# Patient Record
Sex: FEMALE | Race: OTHER | HISPANIC OR LATINO | URBAN - METROPOLITAN AREA
[De-identification: names, ages, dates, MRNs, and addresses within clinical notes are randomized per-mention and may not be internally consistent; named-entity substitution may affect disease eponyms.]

---

## 2020-01-15 ENCOUNTER — EMERGENCY (EMERGENCY)
Facility: HOSPITAL | Age: 35
LOS: 1 days | Discharge: HOME | End: 2020-01-15
Attending: EMERGENCY MEDICINE
Payer: COMMERCIAL

## 2020-01-15 VITALS
RESPIRATION RATE: 20 BRPM | HEART RATE: 113 BPM | WEIGHT: 179.9 LBS | OXYGEN SATURATION: 99 % | SYSTOLIC BLOOD PRESSURE: 113 MMHG | TEMPERATURE: 98 F | DIASTOLIC BLOOD PRESSURE: 78 MMHG | HEIGHT: 62 IN

## 2020-01-15 LAB
ALBUMIN SERPL ELPH-MCNC: 4.2 G/DL — SIGNIFICANT CHANGE UP (ref 3.3–5)
ALP SERPL-CCNC: 107 U/L — SIGNIFICANT CHANGE UP (ref 40–120)
ALT FLD-CCNC: 20 U/L — SIGNIFICANT CHANGE UP (ref 10–45)
ANION GAP SERPL CALC-SCNC: 14 MMOL/L — SIGNIFICANT CHANGE UP (ref 5–17)
APPEARANCE UR: CLEAR — SIGNIFICANT CHANGE UP
AST SERPL-CCNC: 15 U/L — SIGNIFICANT CHANGE UP (ref 10–40)
BACTERIA # UR AUTO: NEGATIVE — SIGNIFICANT CHANGE UP
BASE EXCESS BLDV CALC-SCNC: 0.4 MMOL/L — SIGNIFICANT CHANGE UP (ref -2–2)
BASOPHILS # BLD AUTO: 0.06 K/UL — SIGNIFICANT CHANGE UP (ref 0–0.2)
BASOPHILS NFR BLD AUTO: 0.4 % — SIGNIFICANT CHANGE UP (ref 0–2)
BILIRUB SERPL-MCNC: 0.5 MG/DL — SIGNIFICANT CHANGE UP (ref 0.2–1.2)
BILIRUB UR-MCNC: NEGATIVE — SIGNIFICANT CHANGE UP
BUN SERPL-MCNC: 8 MG/DL — SIGNIFICANT CHANGE UP (ref 7–23)
CA-I SERPL-SCNC: 1.16 MMOL/L — SIGNIFICANT CHANGE UP (ref 1.12–1.3)
CALCIUM SERPL-MCNC: 9.3 MG/DL — SIGNIFICANT CHANGE UP (ref 8.4–10.5)
CHLORIDE BLDV-SCNC: 107 MMOL/L — SIGNIFICANT CHANGE UP (ref 96–108)
CHLORIDE SERPL-SCNC: 102 MMOL/L — SIGNIFICANT CHANGE UP (ref 96–108)
CO2 BLDV-SCNC: 26 MMOL/L — SIGNIFICANT CHANGE UP (ref 22–30)
CO2 SERPL-SCNC: 20 MMOL/L — LOW (ref 22–31)
COLOR SPEC: YELLOW — SIGNIFICANT CHANGE UP
CREAT SERPL-MCNC: 0.6 MG/DL — SIGNIFICANT CHANGE UP (ref 0.5–1.3)
DIFF PNL FLD: NEGATIVE — SIGNIFICANT CHANGE UP
EOSINOPHIL # BLD AUTO: 0.1 K/UL — SIGNIFICANT CHANGE UP (ref 0–0.5)
EOSINOPHIL NFR BLD AUTO: 0.7 % — SIGNIFICANT CHANGE UP (ref 0–6)
EPI CELLS # UR: 8 /HPF — HIGH
GAS PNL BLDV: 135 MMOL/L — SIGNIFICANT CHANGE UP (ref 135–145)
GAS PNL BLDV: SIGNIFICANT CHANGE UP
GAS PNL BLDV: SIGNIFICANT CHANGE UP
GLUCOSE BLDV-MCNC: 102 MG/DL — HIGH (ref 70–99)
GLUCOSE SERPL-MCNC: 105 MG/DL — HIGH (ref 70–99)
GLUCOSE UR QL: NEGATIVE — SIGNIFICANT CHANGE UP
HCG SERPL-ACNC: <2 MIU/ML — SIGNIFICANT CHANGE UP
HCO3 BLDV-SCNC: 25 MMOL/L — SIGNIFICANT CHANGE UP (ref 21–29)
HCT VFR BLD CALC: 39 % — SIGNIFICANT CHANGE UP (ref 34.5–45)
HCT VFR BLDA CALC: 40 % — SIGNIFICANT CHANGE UP (ref 39–50)
HGB BLD CALC-MCNC: 13 G/DL — SIGNIFICANT CHANGE UP (ref 11.5–15.5)
HGB BLD-MCNC: 12.8 G/DL — SIGNIFICANT CHANGE UP (ref 11.5–15.5)
HYALINE CASTS # UR AUTO: 4 /LPF — HIGH (ref 0–2)
IMM GRANULOCYTES NFR BLD AUTO: 0.3 % — SIGNIFICANT CHANGE UP (ref 0–1.5)
KETONES UR-MCNC: NEGATIVE — SIGNIFICANT CHANGE UP
LACTATE BLDV-MCNC: 1.3 MMOL/L — SIGNIFICANT CHANGE UP (ref 0.7–2)
LEUKOCYTE ESTERASE UR-ACNC: NEGATIVE — SIGNIFICANT CHANGE UP
LYMPHOCYTES # BLD AUTO: 1.38 K/UL — SIGNIFICANT CHANGE UP (ref 1–3.3)
LYMPHOCYTES # BLD AUTO: 9.6 % — LOW (ref 13–44)
MCHC RBC-ENTMCNC: 28.8 PG — SIGNIFICANT CHANGE UP (ref 27–34)
MCHC RBC-ENTMCNC: 32.8 GM/DL — SIGNIFICANT CHANGE UP (ref 32–36)
MCV RBC AUTO: 87.6 FL — SIGNIFICANT CHANGE UP (ref 80–100)
MONOCYTES # BLD AUTO: 0.6 K/UL — SIGNIFICANT CHANGE UP (ref 0–0.9)
MONOCYTES NFR BLD AUTO: 4.2 % — SIGNIFICANT CHANGE UP (ref 2–14)
NEUTROPHILS # BLD AUTO: 12.13 K/UL — HIGH (ref 1.8–7.4)
NEUTROPHILS NFR BLD AUTO: 84.8 % — HIGH (ref 43–77)
NITRITE UR-MCNC: NEGATIVE — SIGNIFICANT CHANGE UP
NRBC # BLD: 0 /100 WBCS — SIGNIFICANT CHANGE UP (ref 0–0)
PCO2 BLDV: 42 MMHG — SIGNIFICANT CHANGE UP (ref 35–50)
PH BLDV: 7.39 — SIGNIFICANT CHANGE UP (ref 7.35–7.45)
PH UR: 6.5 — SIGNIFICANT CHANGE UP (ref 5–8)
PLATELET # BLD AUTO: 445 K/UL — HIGH (ref 150–400)
PO2 BLDV: 28 MMHG — SIGNIFICANT CHANGE UP (ref 25–45)
POTASSIUM BLDV-SCNC: 3.8 MMOL/L — SIGNIFICANT CHANGE UP (ref 3.5–5.3)
POTASSIUM SERPL-MCNC: 3.9 MMOL/L — SIGNIFICANT CHANGE UP (ref 3.5–5.3)
POTASSIUM SERPL-SCNC: 3.9 MMOL/L — SIGNIFICANT CHANGE UP (ref 3.5–5.3)
PROT SERPL-MCNC: 7.7 G/DL — SIGNIFICANT CHANGE UP (ref 6–8.3)
PROT UR-MCNC: ABNORMAL
RBC # BLD: 4.45 M/UL — SIGNIFICANT CHANGE UP (ref 3.8–5.2)
RBC # FLD: 12.9 % — SIGNIFICANT CHANGE UP (ref 10.3–14.5)
RBC CASTS # UR COMP ASSIST: 4 /HPF — SIGNIFICANT CHANGE UP (ref 0–4)
SAO2 % BLDV: 49 % — LOW (ref 67–88)
SODIUM SERPL-SCNC: 136 MMOL/L — SIGNIFICANT CHANGE UP (ref 135–145)
SP GR SPEC: >1.05 (ref 1.01–1.02)
UROBILINOGEN FLD QL: NEGATIVE — SIGNIFICANT CHANGE UP
WBC # BLD: 14.32 K/UL — HIGH (ref 3.8–10.5)
WBC # FLD AUTO: 14.32 K/UL — HIGH (ref 3.8–10.5)
WBC UR QL: 6 /HPF — HIGH (ref 0–5)

## 2020-01-15 PROCEDURE — 84702 CHORIONIC GONADOTROPIN TEST: CPT

## 2020-01-15 PROCEDURE — 84132 ASSAY OF SERUM POTASSIUM: CPT

## 2020-01-15 PROCEDURE — 74177 CT ABD & PELVIS W/CONTRAST: CPT

## 2020-01-15 PROCEDURE — 96374 THER/PROPH/DIAG INJ IV PUSH: CPT

## 2020-01-15 PROCEDURE — 82435 ASSAY OF BLOOD CHLORIDE: CPT

## 2020-01-15 PROCEDURE — 85014 HEMATOCRIT: CPT

## 2020-01-15 PROCEDURE — 99284 EMERGENCY DEPT VISIT MOD MDM: CPT | Mod: 25

## 2020-01-15 PROCEDURE — 81001 URINALYSIS AUTO W/SCOPE: CPT

## 2020-01-15 PROCEDURE — 85027 COMPLETE CBC AUTOMATED: CPT

## 2020-01-15 PROCEDURE — 76705 ECHO EXAM OF ABDOMEN: CPT

## 2020-01-15 PROCEDURE — 74177 CT ABD & PELVIS W/CONTRAST: CPT | Mod: 26

## 2020-01-15 PROCEDURE — 80053 COMPREHEN METABOLIC PANEL: CPT

## 2020-01-15 PROCEDURE — 82947 ASSAY GLUCOSE BLOOD QUANT: CPT

## 2020-01-15 PROCEDURE — 99285 EMERGENCY DEPT VISIT HI MDM: CPT

## 2020-01-15 PROCEDURE — 84295 ASSAY OF SERUM SODIUM: CPT

## 2020-01-15 PROCEDURE — 82803 BLOOD GASES ANY COMBINATION: CPT

## 2020-01-15 PROCEDURE — 96375 TX/PRO/DX INJ NEW DRUG ADDON: CPT

## 2020-01-15 PROCEDURE — 82330 ASSAY OF CALCIUM: CPT

## 2020-01-15 PROCEDURE — 76705 ECHO EXAM OF ABDOMEN: CPT | Mod: 26,RT

## 2020-01-15 PROCEDURE — 83605 ASSAY OF LACTIC ACID: CPT

## 2020-01-15 RX ORDER — SODIUM CHLORIDE 9 MG/ML
1000 INJECTION INTRAMUSCULAR; INTRAVENOUS; SUBCUTANEOUS ONCE
Refills: 0 | Status: COMPLETED | OUTPATIENT
Start: 2020-01-15 | End: 2020-01-15

## 2020-01-15 RX ORDER — FAMOTIDINE 10 MG/ML
20 INJECTION INTRAVENOUS ONCE
Refills: 0 | Status: DISCONTINUED | OUTPATIENT
Start: 2020-01-15 | End: 2020-01-15

## 2020-01-15 RX ORDER — ONDANSETRON 8 MG/1
1 TABLET, FILM COATED ORAL
Qty: 12 | Refills: 0
Start: 2020-01-15

## 2020-01-15 RX ORDER — FAMOTIDINE 10 MG/ML
20 INJECTION INTRAVENOUS ONCE
Refills: 0 | Status: COMPLETED | OUTPATIENT
Start: 2020-01-15 | End: 2020-01-15

## 2020-01-15 RX ORDER — SODIUM CHLORIDE 9 MG/ML
1000 INJECTION, SOLUTION INTRAVENOUS ONCE
Refills: 0 | Status: DISCONTINUED | OUTPATIENT
Start: 2020-01-15 | End: 2020-01-15

## 2020-01-15 RX ORDER — ACETAMINOPHEN 500 MG
650 TABLET ORAL ONCE
Refills: 0 | Status: COMPLETED | OUTPATIENT
Start: 2020-01-15 | End: 2020-01-15

## 2020-01-15 RX ORDER — ONDANSETRON 8 MG/1
4 TABLET, FILM COATED ORAL ONCE
Refills: 0 | Status: COMPLETED | OUTPATIENT
Start: 2020-01-15 | End: 2020-01-15

## 2020-01-15 RX ORDER — OXYCODONE HYDROCHLORIDE 5 MG/1
1 TABLET ORAL
Qty: 15 | Refills: 0
Start: 2020-01-15

## 2020-01-15 RX ORDER — MORPHINE SULFATE 50 MG/1
4 CAPSULE, EXTENDED RELEASE ORAL ONCE
Refills: 0 | Status: DISCONTINUED | OUTPATIENT
Start: 2020-01-15 | End: 2020-01-15

## 2020-01-15 RX ORDER — ONDANSETRON 8 MG/1
4 TABLET, FILM COATED ORAL ONCE
Refills: 0 | Status: DISCONTINUED | OUTPATIENT
Start: 2020-01-15 | End: 2020-01-15

## 2020-01-15 RX ADMIN — Medication 650 MILLIGRAM(S): at 21:06

## 2020-01-15 RX ADMIN — FAMOTIDINE 20 MILLIGRAM(S): 10 INJECTION INTRAVENOUS at 17:28

## 2020-01-15 RX ADMIN — MORPHINE SULFATE 4 MILLIGRAM(S): 50 CAPSULE, EXTENDED RELEASE ORAL at 17:27

## 2020-01-15 RX ADMIN — Medication 30 MILLILITER(S): at 21:06

## 2020-01-15 RX ADMIN — ONDANSETRON 4 MILLIGRAM(S): 8 TABLET, FILM COATED ORAL at 17:28

## 2020-01-15 RX ADMIN — SODIUM CHLORIDE 1000 MILLILITER(S): 9 INJECTION INTRAMUSCULAR; INTRAVENOUS; SUBCUTANEOUS at 17:29

## 2020-01-15 RX ADMIN — SODIUM CHLORIDE 1000 MILLILITER(S): 9 INJECTION INTRAMUSCULAR; INTRAVENOUS; SUBCUTANEOUS at 19:42

## 2020-01-15 NOTE — ED PROVIDER NOTE - RAPID ASSESSMENT
Juan Manuel De Jesus MD: 34yo female pmh HTN, thrombocytosis, leukocytosis p/w epigastric pain with radiation to the back started this morning, migrated to the RLQ several hours ago. Vomiting x 3, diarrhea x 2. +subj fevers, chills. Denies cough, rash, dysuria. Pt appears uncomfortable. TTP epigastrium and RLQ. +limp, unable to flex right thigh 2/2 pain. Concern for appendicitis, CT a/p, labs. NPO today     Patient was seen as a QDOC patient, a physical exam was not performed as there is no physical exam room available the patient will be seen and further worked up in the main emergency department and their care will be completed by the main emergency department team.

## 2020-01-15 NOTE — ED PROVIDER NOTE - PATIENT PORTAL LINK FT
You can access the FollowMyHealth Patient Portal offered by [facility] by registering at the following website: [email or web address]. By joining [employer]’s FollowMyHealth portal, you will also be able to view your health information using other applications (apps) compatible with our system. You can access the FollowMyHealth Patient Portal offered by [facility] by registering at the following website: [email or web address]. By joining [employer]s FollowMyHealth portal, you will also be able to view your health information using other applications (apps) compatible with our system.

## 2020-01-15 NOTE — ED PROVIDER NOTE - NSFOLLOWUPCLINICS_GEN_ALL_ED_FT
[facility]  General Surgery  [address] [place] [address] - 3rd [place] [address]  Phone: [phone]  Fax:   Follow Up Time: 7-10 Days

## 2020-01-15 NOTE — ED PROVIDER NOTE - ATTENDING CONTRIBUTION TO CARE
abdominal pain 1 day  luq / rlq ttp  perhaps appy / consider gyn pathology / labs / ctap / Symptomatic treatment. consider pelvic sono

## 2020-01-15 NOTE — ED PROVIDER NOTE - OBJECTIVE STATEMENT
agree with above:  35 female  HTN, thrombocytosis, leukocytosis p/w epigastric pain migrated to the RLQ. Vomiting x 3, diarrhea x 2. +subj fevers, chills. Denies cough, rash, dysuria. LMP 1 week ago.

## 2020-01-15 NOTE — ED ADULT NURSE NOTE - NSIMPLEMENTINTERV_GEN_ALL_ED
Implemented All Universal Safety Interventions:  [place] to call system. Call bell, personal items and telephone within reach. Instruct patient to call for assistance. Room bathroom lighting operational. Non-slip footwear when patient is off stretcher. Physically safe environment: no spills, clutter or unnecessary equipment. Stretcher in lowest position, wheels locked, appropriate side rails in place.
symptomatic rx

## 2020-01-15 NOTE — ED PROVIDER NOTE - PROGRESS NOTE DETAILS
feeling better, ate chicken sandwich,  wants to go home, reviewed case and results w pt, questions answered and pt understands, advised return precautions and care plan.

## 2020-01-15 NOTE — ED ADULT NURSE REASSESSMENT NOTE - NS ED NURSE REASSESS COMMENT FT1
pt resting in bed, pt eating and drinking for po trial,  vital signs performed. will cont to monitor

## 2020-01-15 NOTE — ED PROVIDER NOTE - CLINICAL SUMMARY MEDICAL DECISION MAKING FREE TEXT BOX
Epigastric and RLQ abd pain with assoc vomiting for past several hours.  vitals stable, afebrile, physical exam for RLQ abd TTP, will r/o appy  Obtain labs, provide meds, CT

## 2020-01-15 NOTE — ED ADULT NURSE REASSESSMENT NOTE - NS ED NURSE REASSESS COMMENT FT1
pt complaining of abdominal pain, md newton made aware. tylenol and maalox given. pt ordered abd ct pt complaining of abdominal pain, md newton made aware. tylenol and maalox given. pt ordered abd US

## 2020-01-15 NOTE — ED PROVIDER NOTE - CARE PROVIDER_API CALL
Misha Agosto)  Gastroenterology; Internal Medicine  [address] [place] [address]  Phone: [phone]  Fax: [phone]  Follow Up Time: 7-10 Days

## 2020-01-15 NOTE — ED ADULT NURSE NOTE - OBJECTIVE STATEMENT
35 year old female with 8/10 abdominal pain, nausea and vomiting. At 8:30 this AM patient began feeling ill, reports 3 episodes of vomiting today.Pain radiates from epigastric area to right flank to RLQ, Positive CVA tenderness to right flank. 2 episodes of diarreah today. Denies blood in vomitus, hematuria, hematochezia, melena. Abdomen tender, distended. A&Ox4 gross neuro intact, lungs cta bilaterally, no difficulty speaking in complete sentences, s1s2 heart sounds heard, pulses x 4, robin x4,, skin intact. Denies headache, dizziness, vision changes, chest pain, shortness of breath fevers, chills, dysuria, hematuria, recent illness travel or fall.

## 2020-01-15 NOTE — ED PROVIDER NOTE - NSFOLLOWUPINSTRUCTIONS_ED_ALL_ED_FT
IMPORTANT INSTRUCTIONS FROM Dr. SOARES:    Please follow up with your personal medical doctor in 24-48 hours.   Bring results from today to your visit. Your blood remains abnormal as you mentioned.    See GI specialist within 1-2 wks.    If you were advised to take any medications - be sure to review the package insert.    If your symptoms change, get worse or if you have any new symptoms, come to the ER right away.  If you have any questions, call the ER at the phone number on this page.

## 2020-01-15 NOTE — ED PROVIDER NOTE - CARE PLAN
Principal Discharge DX:	Enteritis Principal Discharge DX:	Enteritis  Goal:	Gallstones Principal Discharge DX:	Enteritis  Goal:	Gallstones, symptomatic

## 2020-01-16 VITALS
SYSTOLIC BLOOD PRESSURE: 110 MMHG | HEART RATE: 100 BPM | DIASTOLIC BLOOD PRESSURE: 72 MMHG | RESPIRATION RATE: 20 BRPM | OXYGEN SATURATION: 97 % | TEMPERATURE: 98 F

## 2020-01-17 PROBLEM — Z00.00 ENCOUNTER FOR PREVENTIVE HEALTH EXAMINATION: Status: ACTIVE | Noted: 2020-01-17

## 2020-01-21 ENCOUNTER — APPOINTMENT (OUTPATIENT)
Dept: SURGERY | Facility: CLINIC | Age: 35
End: 2020-01-21
Payer: COMMERCIAL

## 2020-01-21 VITALS
DIASTOLIC BLOOD PRESSURE: 85 MMHG | TEMPERATURE: 98.2 F | SYSTOLIC BLOOD PRESSURE: 127 MMHG | HEIGHT: 62 IN | HEART RATE: 76 BPM | OXYGEN SATURATION: 94 % | BODY MASS INDEX: 32.76 KG/M2 | WEIGHT: 178 LBS

## 2020-01-21 DIAGNOSIS — K80.20 CALCULUS OF GALLBLADDER W/OUT CHOLECYSTITIS W/OUT OBSTRUCTION: ICD-10-CM

## 2020-01-21 DIAGNOSIS — Z82.3 FAMILY HISTORY OF STROKE: ICD-10-CM

## 2020-01-21 DIAGNOSIS — Z56.0 UNEMPLOYMENT, UNSPECIFIED: ICD-10-CM

## 2020-01-21 DIAGNOSIS — I10 ESSENTIAL (PRIMARY) HYPERTENSION: ICD-10-CM

## 2020-01-21 PROCEDURE — 99243 OFF/OP CNSLTJ NEW/EST LOW 30: CPT

## 2020-01-21 SDOH — ECONOMIC STABILITY - INCOME SECURITY: UNEMPLOYMENT, UNSPECIFIED: Z56.0

## 2020-01-21 NOTE — REVIEW OF SYSTEMS
[Abdominal Pain] : abdominal pain [Fever] : no fever [Chills] : no chills [Chest Pain] : no chest pain [Shortness Of Breath] : no shortness of breath [Lower Ext Edema] : no lower extremity edema [Cough] : no cough [Pelvic Pain] : no pelvic pain [Arthralgias] : no arthralgias [Joint Pain] : no joint pain [Skin Lesions] : no skin lesions [Skin Wound] : no skin wound [Dizziness] : no dizziness [Anxiety] : no anxiety [Muscle Weakness] : no muscle weakness [Swollen Glands] : no swollen glands [FreeTextEntry7] : RUQ abdominal pain, radiating to the back

## 2020-01-21 NOTE — PLAN
[FreeTextEntry1] : Patient with symptomatic gallstones and abdominal pain. Had a long d/w the pt. All the options, benefits and risks of laparoscopic cholecystectomy was discussed. The potential to go open, the small potential for bleeding, CBD injury, bile leak and other related complications were discussed. All the questions were answered to her satisfaction. \par Will schedule at [facility]\par \par

## 2020-01-21 NOTE — DATA REVIEWED
[FreeTextEntry1] :  EXAM:  US ABDOMEN RT UPR QUADRANT                      \par \par PROCEDURE DATE:  01/15/2020  \par \par INTERPRETATION:  CLINICAL INFORMATION: Abdominal pain.\par \par COMPARISON: None available.\par \par TECHNIQUE: Sonography of the right upper quadrant. \par \par FINDINGS:\par \par Liver: Hepatomegaly, 20 cm length. Hepatic steatosis. Hepatopedal blood flow in the main portal vein.\par \par Bile ducts: Normal caliber. Common bile duct measures 3 mm. \par \par Gallbladder: Large nonmobile stone. Gallbladder sludge. Patient received pain meds prior to study. Negative sonographic Emndoza sign. No pericholecystic fluid or gallbladder wall edema.    \par \par Pancreas: Not visualized.\par \par Right kidney: 11.9 cm. No hydronephrosis.\par \par Ascites: None.\par \par IVC and Aorta: Visualized portions are within normal limits.\par \par IMPRESSION: \par \par Cholelithiasis and hepatic steatosis.\par

## 2020-01-21 NOTE — ASSESSMENT
[FreeTextEntry1] : 35 y.o F presents to the office for consultation visit, she c/o RUQ abdominal pain. Patient had an ABD US at [facility], 01/15/20, results c/w cholelithiasis and hepatic steatosis.

## 2020-01-21 NOTE — HISTORY OF PRESENT ILLNESS
[de-identified] : 35 y.o F presents to the office for consultation visit, she c/o RUQ abdominal pain. Patient had an ABD US at [facility], 01/15/20, results c/w cholelithiasis and hepatic steatosis. Patient states she had severe abdominal pain when she went to the hospital and had been given Morphine to alleviate the pain. She had been experiencing RUQ abdominal pain, radiating to the back. She also describes nausea/bilious vomiting.

## 2020-01-21 NOTE — CONSULT LETTER
[Dear  ___] : Dear  [unfilled], [Consult Letter:] : I had the pleasure of evaluating your patient, [unfilled]. [Consult Closing:] : Thank you very much for allowing me to participate in the care of this patient.  If you have any questions, please do not hesitate to contact me. [Sincerely,] : Sincerely, [FreeTextEntry3] : Layo Blake MD\par

## 2020-01-27 ENCOUNTER — OUTPATIENT (OUTPATIENT)
Dept: OUTPATIENT SERVICES | Facility: HOSPITAL | Age: 35
LOS: 1 days | End: 2020-01-27
Payer: COMMERCIAL

## 2020-01-27 VITALS
RESPIRATION RATE: 17 BRPM | HEIGHT: 62 IN | OXYGEN SATURATION: 100 % | SYSTOLIC BLOOD PRESSURE: 154 MMHG | DIASTOLIC BLOOD PRESSURE: 78 MMHG | HEART RATE: 74 BPM | WEIGHT: 184.97 LBS | TEMPERATURE: 98 F

## 2020-01-27 DIAGNOSIS — K81.9 CHOLECYSTITIS, UNSPECIFIED: ICD-10-CM

## 2020-01-27 DIAGNOSIS — K80.20 CALCULUS OF GALLBLADDER WITHOUT CHOLECYSTITIS WITHOUT OBSTRUCTION: ICD-10-CM

## 2020-01-27 DIAGNOSIS — Z98.891 HISTORY OF UTERINE SCAR FROM PREVIOUS SURGERY: Chronic | ICD-10-CM

## 2020-01-27 DIAGNOSIS — Z01.818 ENCOUNTER FOR OTHER PREPROCEDURAL EXAMINATION: ICD-10-CM

## 2020-01-27 DIAGNOSIS — I10 ESSENTIAL (PRIMARY) HYPERTENSION: ICD-10-CM

## 2020-01-27 LAB
ANION GAP SERPL CALC-SCNC: 6 MMOL/L — SIGNIFICANT CHANGE UP (ref 5–17)
APTT BLD: 33.2 SEC — SIGNIFICANT CHANGE UP (ref 27.5–36.3)
BLD GP AB SCN SERPL QL: SIGNIFICANT CHANGE UP
BUN SERPL-MCNC: 10 MG/DL — SIGNIFICANT CHANGE UP (ref 7–18)
CALCIUM SERPL-MCNC: 9.7 MG/DL — SIGNIFICANT CHANGE UP (ref 8.4–10.5)
CHLORIDE SERPL-SCNC: 104 MMOL/L — SIGNIFICANT CHANGE UP (ref 96–108)
CO2 SERPL-SCNC: 28 MMOL/L — SIGNIFICANT CHANGE UP (ref 22–31)
CREAT SERPL-MCNC: 0.6 MG/DL — SIGNIFICANT CHANGE UP (ref 0.5–1.3)
GLUCOSE SERPL-MCNC: 102 MG/DL — HIGH (ref 70–99)
HCG SERPL-ACNC: <1 MIU/ML — SIGNIFICANT CHANGE UP
HCT VFR BLD CALC: 38.9 % — SIGNIFICANT CHANGE UP (ref 34.5–45)
HGB BLD-MCNC: 12.6 G/DL — SIGNIFICANT CHANGE UP (ref 11.5–15.5)
INR BLD: 0.95 RATIO — SIGNIFICANT CHANGE UP (ref 0.88–1.16)
MCHC RBC-ENTMCNC: 28.4 PG — SIGNIFICANT CHANGE UP (ref 27–34)
MCHC RBC-ENTMCNC: 32.4 GM/DL — SIGNIFICANT CHANGE UP (ref 32–36)
MCV RBC AUTO: 87.6 FL — SIGNIFICANT CHANGE UP (ref 80–100)
NRBC # BLD: 0 /100 WBCS — SIGNIFICANT CHANGE UP (ref 0–0)
PLATELET # BLD AUTO: 466 K/UL — HIGH (ref 150–400)
POTASSIUM SERPL-MCNC: 3.8 MMOL/L — SIGNIFICANT CHANGE UP (ref 3.5–5.3)
POTASSIUM SERPL-SCNC: 3.8 MMOL/L — SIGNIFICANT CHANGE UP (ref 3.5–5.3)
PROTHROM AB SERPL-ACNC: 10.5 SEC — SIGNIFICANT CHANGE UP (ref 10–12.9)
RBC # BLD: 4.44 M/UL — SIGNIFICANT CHANGE UP (ref 3.8–5.2)
RBC # FLD: 12.8 % — SIGNIFICANT CHANGE UP (ref 10.3–14.5)
SODIUM SERPL-SCNC: 138 MMOL/L — SIGNIFICANT CHANGE UP (ref 135–145)
WBC # BLD: 13.25 K/UL — HIGH (ref 3.8–10.5)
WBC # FLD AUTO: 13.25 K/UL — HIGH (ref 3.8–10.5)

## 2020-01-27 PROCEDURE — G0463: CPT

## 2020-01-27 NOTE — H&P PST ADULT - NSANTHOSAYNRD_GEN_A_CORE
No. DION screening performed.  STOP BANG Legend: 0-2 = LOW Risk; 3-4 = INTERMEDIATE Risk; 5-8 = HIGH Risk/Denies hx/dx of DION and/or CPAP use

## 2020-01-27 NOTE — H&P PST ADULT - HISTORY OF PRESENT ILLNESS
35year old female with pmhx of anemia, hepatic steatosis, leukocytosis, thrombocytosis and hypertension presents with c/o nausea, vomiting and RUQ area of abdomen when she presented to the ER on 1/15/20. Patient had work up done revealing hepatic steatosis an cholelithiasis. Patient is here today for presurgical testing for scheduled laparoscopic cholecystectomy, possible open on 1/29/20

## 2020-01-27 NOTE — H&P PST ADULT - NSICDXPASTMEDICALHX_GEN_ALL_CORE_FT
PAST MEDICAL HISTORY:  Hepatic steatosis     History of leukocytosis     History of thrombocytosis     HTN (hypertension)     Parvovirus infection

## 2020-01-28 ENCOUNTER — TRANSCRIPTION ENCOUNTER (OUTPATIENT)
Age: 35
End: 2020-01-28

## 2020-01-29 ENCOUNTER — APPOINTMENT (OUTPATIENT)
Dept: SURGERY | Facility: HOSPITAL | Age: 35
End: 2020-01-29

## 2020-01-29 ENCOUNTER — OUTPATIENT (OUTPATIENT)
Dept: OUTPATIENT SERVICES | Facility: HOSPITAL | Age: 35
LOS: 1 days | End: 2020-01-29
Payer: COMMERCIAL

## 2020-01-29 ENCOUNTER — RESULT REVIEW (OUTPATIENT)
Age: 35
End: 2020-01-29

## 2020-01-29 VITALS
HEART RATE: 82 BPM | WEIGHT: 184.97 LBS | RESPIRATION RATE: 14 BRPM | DIASTOLIC BLOOD PRESSURE: 78 MMHG | SYSTOLIC BLOOD PRESSURE: 131 MMHG | TEMPERATURE: 97 F | HEIGHT: 62 IN | OXYGEN SATURATION: 100 %

## 2020-01-29 VITALS
HEART RATE: 89 BPM | OXYGEN SATURATION: 98 % | DIASTOLIC BLOOD PRESSURE: 73 MMHG | SYSTOLIC BLOOD PRESSURE: 126 MMHG | RESPIRATION RATE: 14 BRPM

## 2020-01-29 DIAGNOSIS — K80.20 CALCULUS OF GALLBLADDER WITHOUT CHOLECYSTITIS WITHOUT OBSTRUCTION: ICD-10-CM

## 2020-01-29 DIAGNOSIS — Z98.891 HISTORY OF UTERINE SCAR FROM PREVIOUS SURGERY: Chronic | ICD-10-CM

## 2020-01-29 LAB — BLD GP AB SCN SERPL QL: SIGNIFICANT CHANGE UP

## 2020-01-29 PROCEDURE — 36415 COLL VENOUS BLD VENIPUNCTURE: CPT

## 2020-01-29 PROCEDURE — 47562 LAPAROSCOPIC CHOLECYSTECTOMY: CPT

## 2020-01-29 PROCEDURE — 86901 BLOOD TYPING SEROLOGIC RH(D): CPT

## 2020-01-29 PROCEDURE — 86850 RBC ANTIBODY SCREEN: CPT

## 2020-01-29 PROCEDURE — 47562 LAPAROSCOPIC CHOLECYSTECTOMY: CPT | Mod: AS

## 2020-01-29 PROCEDURE — C1889: CPT

## 2020-01-29 PROCEDURE — 88304 TISSUE EXAM BY PATHOLOGIST: CPT

## 2020-01-29 PROCEDURE — 88304 TISSUE EXAM BY PATHOLOGIST: CPT | Mod: 26

## 2020-01-29 PROCEDURE — 86900 BLOOD TYPING SEROLOGIC ABO: CPT

## 2020-01-29 RX ORDER — SODIUM CHLORIDE 9 MG/ML
1000 INJECTION, SOLUTION INTRAVENOUS
Refills: 0 | Status: DISCONTINUED | OUTPATIENT
Start: 2020-01-29 | End: 2020-01-29

## 2020-01-29 RX ORDER — FENTANYL CITRATE 50 UG/ML
50 INJECTION INTRAVENOUS
Refills: 0 | Status: DISCONTINUED | OUTPATIENT
Start: 2020-01-29 | End: 2020-01-29

## 2020-01-29 RX ORDER — SODIUM CHLORIDE 9 MG/ML
3 INJECTION INTRAMUSCULAR; INTRAVENOUS; SUBCUTANEOUS EVERY 8 HOURS
Refills: 0 | Status: DISCONTINUED | OUTPATIENT
Start: 2020-01-29 | End: 2020-01-29

## 2020-01-29 RX ORDER — ACETAMINOPHEN WITH CODEINE 300MG-30MG
1 TABLET ORAL EVERY 4 HOURS
Refills: 0 | Status: DISCONTINUED | OUTPATIENT
Start: 2020-01-29 | End: 2020-01-29

## 2020-01-29 RX ORDER — FENTANYL CITRATE 50 UG/ML
25 INJECTION INTRAVENOUS
Refills: 0 | Status: DISCONTINUED | OUTPATIENT
Start: 2020-01-29 | End: 2020-01-29

## 2020-01-29 RX ORDER — ACETAMINOPHEN WITH CODEINE 300MG-30MG
1 TABLET ORAL
Qty: 8 | Refills: 0
Start: 2020-01-29

## 2020-01-29 RX ADMIN — FENTANYL CITRATE 50 MICROGRAM(S): 50 INJECTION INTRAVENOUS at 11:12

## 2020-01-29 RX ADMIN — FENTANYL CITRATE 25 MICROGRAM(S): 50 INJECTION INTRAVENOUS at 11:11

## 2020-01-29 RX ADMIN — FENTANYL CITRATE 50 MICROGRAM(S): 50 INJECTION INTRAVENOUS at 10:48

## 2020-01-29 RX ADMIN — FENTANYL CITRATE 25 MICROGRAM(S): 50 INJECTION INTRAVENOUS at 11:56

## 2020-01-29 NOTE — ASU DISCHARGE PLAN (ADULT/PEDIATRIC) - CARE PROVIDER_API CALL
Layo Blake (MD)  Surgery  [address], [place] [address]  Phone: [phone]  Fax: [phone]  Follow Up Time:

## 2020-01-29 NOTE — ASU PATIENT PROFILE, ADULT - PMH
Hepatic steatosis    History of leukocytosis    History of thrombocytosis    HTN (hypertension)    Parvovirus infection

## 2020-01-29 NOTE — ASU DISCHARGE PLAN (ADULT/PEDIATRIC) - MODE OF TRANSPORTATION
Operative Note    Name: Rob White   Medical Record Number: [id number]   YOB: 1985  Today's Date: 2017      Pre-operative Diagnosis: Fetal Intolerance of Labor  Failure to Progress    Post-operative Diagnosis: Fetal Intolerance of labor, failure to progress    Operation: Low Cervical Transverse Procedure(s):   SECTION    Surgeon(s):  Maria Luz Gallagher MD    Anesthesia: Spinal    Prophylactic Antibiotics: Ancef  DVT Prophylaxis: Sequential Compression Devices         Fetal Description: [place]     Birth Information:   Information for the patient's :  Libby sorensen [[id number]]   Delivery of a 3.31 kg Male [2] infant on 10/6/2017 at 11:16 AM. Apgars were 9 and 9. Umbilical Cord:     Umbilical Cord Events:     Placenta:  removal with  appearance. Amniotic Fluid Volume: Moderate     Amniotic Fluid Description:           Umbilical Cord: compressed by posterior shoulder    Placenta:  manual removal    Specimens: none           Complications:  none    Procedure Detail:      After proper patient identification and consent, the patient was taken to the operating room, where epidural anesthesia was dosed and found to be adequate. Massey catheter had been placed using sterile technique. The patient was prepped and draped in the normal sterile fashion. The abdomen was entered using the Pfannenstiel technique. The peritoneum was entered sharply well superior to the bladder without any apparent injury. The Yariel self retaining retractor was placed. The bladder flap was created without difficulty. A low transverse uterine incision was made with the scalpel and extended with blunt finger dissection. Amniotomy was performed and the fluid was medium amount clear. The babys head was then delivered atraumatically. It was OP and asynclitic toward the right. The nose and mouth were suctioned.  The cord was clamped and cut and the baby was handed off to Nursing staff in attendance. Placenta was then removed from the uterus. The uterus was curettaged with a moist lap pad and cleared of all clots and debris. The uterine incision extended into the cervix on the right side but did not extend into the broad ligament. The uterine incision was closed with double layer 0-monocryl in running locking fashion with good hemostasis assured. The anterior pelvis was irrigated with warm normal saline and good hemostasis was again reassured throughout. The retractor was removed. The peritoneum and rectus muscles were reapproximated with 0-monocryl. The fascia was closed with 0 Vicryl in a running fashion. Good hemostasis was assured. The skin was closed with an absorpable suture. The patient tolerated the procedure well. Sponge, lap, and needle counts were correct times three and the patient and baby were taken to recovery/postpartum room in stable condition.     Larissa Gómez MD  October 6, 2017  1:00 PM Wheelchair/Stroller

## 2020-01-31 LAB — SURGICAL PATHOLOGY STUDY: SIGNIFICANT CHANGE UP

## 2020-02-03 PROBLEM — B34.3 PARVOVIRUS INFECTION, UNSPECIFIED: Chronic | Status: ACTIVE | Noted: 2020-01-27

## 2020-02-03 PROBLEM — Z86.2 PERSONAL HISTORY OF DISEASES OF THE BLOOD AND BLOOD-FORMING ORGANS AND CERTAIN DISORDERS INVOLVING THE IMMUNE MECHANISM: Chronic | Status: ACTIVE | Noted: 2020-01-27

## 2020-02-03 PROBLEM — K76.0 FATTY (CHANGE OF) LIVER, NOT ELSEWHERE CLASSIFIED: Chronic | Status: ACTIVE | Noted: 2020-01-27

## 2020-02-10 ENCOUNTER — APPOINTMENT (OUTPATIENT)
Dept: SURGERY | Facility: CLINIC | Age: 35
End: 2020-02-10
Payer: COMMERCIAL

## 2020-02-10 PROCEDURE — 99024 POSTOP FOLLOW-UP VISIT: CPT

## 2020-02-10 NOTE — HISTORY OF PRESENT ILLNESS
[de-identified] : AWAIS MILLS presents to the office for postoperative visit today, she is s/p laparoscopic cholecystectomy, 01/29/20. patient without reported complaints. no fevers/chills. denies pain. no nausea/vomiting.\par Path results c/w Chronic calculous cholecystitis.

## 2020-02-10 NOTE — PLAN
[FreeTextEntry1] : Path results discussed, copy of the report given to the patient. \par \par Post operative wound care, activity and restrictions/precautions were reinforced. \par Patient was instructed to refrain from any heavy lifting >10-15 lbs for at least 4 weeks post operatively. \par \par Patient's questions and concerns addressed.\par \par patient will follow up if needed. Warning signs, follow up, and restrictions were discussed with the patient.\par

## 2020-02-10 NOTE — PHYSICAL EXAM
[Normal Rate and Rhythm] : normal rate and rhythm [Normal Breath Sounds] : Normal breath sounds [Alert] : alert [No Rash or Lesion] : No rash or lesion [Oriented to Time] : oriented to time [Oriented to Person] : oriented to person [Oriented to Place] : oriented to place [Calm] : calm [JVD] : no jugular venous distention  [de-identified] : A/Ox3; NAD. appears comfortable [de-identified] : EOMI, Anicteric sclera [de-identified] : Abdomen soft and non tender. Wounds healing well. Port sites with no erythema or drainage.

## 2020-02-10 NOTE — REASON FOR VISIT
[Post Op: _________] : a [unfilled] post op visit [FreeTextEntry1] : s/p laparoscopic cholecystectomy, 01/29/20

## 2020-02-10 NOTE — ASSESSMENT
[FreeTextEntry1] : AWAIS MILLS presents to the office for postoperative visit today, she is s/p laparoscopic cholecystectomy, 01/29/20. Path results c/w Chronic calculous cholecystitis. Patient is without reported complaints. Denies pain. No fevers/chills. \par \par Patient is doing well, with excellent post-operative recovery. All surgical incisions are healing well and as expected. There is no evidence of infection or complication, and patient is progressing as expected.\par \par

## 2020-02-19 ENCOUNTER — EMERGENCY (EMERGENCY)
Facility: HOSPITAL | Age: 35
LOS: 1 days | Discharge: HOME | End: 2020-02-19
Attending: EMERGENCY MEDICINE
Payer: COMMERCIAL

## 2020-02-19 VITALS
OXYGEN SATURATION: 99 % | DIASTOLIC BLOOD PRESSURE: 77 MMHG | SYSTOLIC BLOOD PRESSURE: 123 MMHG | HEART RATE: 73 BPM | RESPIRATION RATE: 18 BRPM

## 2020-02-19 VITALS
WEIGHT: 179.9 LBS | HEIGHT: 62 IN | SYSTOLIC BLOOD PRESSURE: 113 MMHG | HEART RATE: 79 BPM | TEMPERATURE: 98 F | OXYGEN SATURATION: 98 % | RESPIRATION RATE: 18 BRPM | DIASTOLIC BLOOD PRESSURE: 78 MMHG

## 2020-02-19 DIAGNOSIS — Z98.891 HISTORY OF UTERINE SCAR FROM PREVIOUS SURGERY: Chronic | ICD-10-CM

## 2020-02-19 LAB
ALBUMIN SERPL ELPH-MCNC: 3.7 G/DL — SIGNIFICANT CHANGE UP (ref 3.5–5)
ALP SERPL-CCNC: 99 U/L — SIGNIFICANT CHANGE UP (ref 40–120)
ALT FLD-CCNC: 28 U/L DA — SIGNIFICANT CHANGE UP (ref 10–60)
ANION GAP SERPL CALC-SCNC: 6 MMOL/L — SIGNIFICANT CHANGE UP (ref 5–17)
APTT BLD: 33.9 SEC — SIGNIFICANT CHANGE UP (ref 27.5–36.3)
AST SERPL-CCNC: 13 U/L — SIGNIFICANT CHANGE UP (ref 10–40)
BASOPHILS # BLD AUTO: 0.07 K/UL — SIGNIFICANT CHANGE UP (ref 0–0.2)
BASOPHILS NFR BLD AUTO: 0.9 % — SIGNIFICANT CHANGE UP (ref 0–2)
BILIRUB SERPL-MCNC: 0.2 MG/DL — SIGNIFICANT CHANGE UP (ref 0.2–1.2)
BUN SERPL-MCNC: 9 MG/DL — SIGNIFICANT CHANGE UP (ref 7–18)
CALCIUM SERPL-MCNC: 8.6 MG/DL — SIGNIFICANT CHANGE UP (ref 8.4–10.5)
CHLORIDE SERPL-SCNC: 108 MMOL/L — SIGNIFICANT CHANGE UP (ref 96–108)
CO2 SERPL-SCNC: 24 MMOL/L — SIGNIFICANT CHANGE UP (ref 22–31)
CREAT SERPL-MCNC: 0.52 MG/DL — SIGNIFICANT CHANGE UP (ref 0.5–1.3)
EOSINOPHIL # BLD AUTO: 0.28 K/UL — SIGNIFICANT CHANGE UP (ref 0–0.5)
EOSINOPHIL NFR BLD AUTO: 3.8 % — SIGNIFICANT CHANGE UP (ref 0–6)
GLUCOSE SERPL-MCNC: 80 MG/DL — SIGNIFICANT CHANGE UP (ref 70–99)
HCG SERPL-ACNC: <1 MIU/ML — SIGNIFICANT CHANGE UP
HCT VFR BLD CALC: 37.4 % — SIGNIFICANT CHANGE UP (ref 34.5–45)
HGB BLD-MCNC: 12.3 G/DL — SIGNIFICANT CHANGE UP (ref 11.5–15.5)
IMM GRANULOCYTES NFR BLD AUTO: 0.9 % — SIGNIFICANT CHANGE UP (ref 0–1.5)
INR BLD: 0.98 RATIO — SIGNIFICANT CHANGE UP (ref 0.88–1.16)
LYMPHOCYTES # BLD AUTO: 3.22 K/UL — SIGNIFICANT CHANGE UP (ref 1–3.3)
LYMPHOCYTES # BLD AUTO: 43.6 % — SIGNIFICANT CHANGE UP (ref 13–44)
MCHC RBC-ENTMCNC: 28.8 PG — SIGNIFICANT CHANGE UP (ref 27–34)
MCHC RBC-ENTMCNC: 32.9 GM/DL — SIGNIFICANT CHANGE UP (ref 32–36)
MCV RBC AUTO: 87.6 FL — SIGNIFICANT CHANGE UP (ref 80–100)
MONOCYTES # BLD AUTO: 0.62 K/UL — SIGNIFICANT CHANGE UP (ref 0–0.9)
MONOCYTES NFR BLD AUTO: 8.4 % — SIGNIFICANT CHANGE UP (ref 2–14)
NEUTROPHILS # BLD AUTO: 3.12 K/UL — SIGNIFICANT CHANGE UP (ref 1.8–7.4)
NEUTROPHILS NFR BLD AUTO: 42.4 % — LOW (ref 43–77)
NRBC # BLD: 0 /100 WBCS — SIGNIFICANT CHANGE UP (ref 0–0)
PLATELET # BLD AUTO: 421 K/UL — HIGH (ref 150–400)
POTASSIUM SERPL-MCNC: 3.9 MMOL/L — SIGNIFICANT CHANGE UP (ref 3.5–5.3)
POTASSIUM SERPL-SCNC: 3.9 MMOL/L — SIGNIFICANT CHANGE UP (ref 3.5–5.3)
PROT SERPL-MCNC: 7.9 G/DL — SIGNIFICANT CHANGE UP (ref 6–8.3)
PROTHROM AB SERPL-ACNC: 10.9 SEC — SIGNIFICANT CHANGE UP (ref 10–12.9)
RBC # BLD: 4.27 M/UL — SIGNIFICANT CHANGE UP (ref 3.8–5.2)
RBC # FLD: 13.2 % — SIGNIFICANT CHANGE UP (ref 10.3–14.5)
SODIUM SERPL-SCNC: 138 MMOL/L — SIGNIFICANT CHANGE UP (ref 135–145)
WBC # BLD: 7.38 K/UL — SIGNIFICANT CHANGE UP (ref 3.8–10.5)
WBC # FLD AUTO: 7.38 K/UL — SIGNIFICANT CHANGE UP (ref 3.8–10.5)

## 2020-02-19 PROCEDURE — 99284 EMERGENCY DEPT VISIT MOD MDM: CPT

## 2020-02-19 PROCEDURE — 80053 COMPREHEN METABOLIC PANEL: CPT

## 2020-02-19 PROCEDURE — 85610 PROTHROMBIN TIME: CPT

## 2020-02-19 PROCEDURE — 84702 CHORIONIC GONADOTROPIN TEST: CPT

## 2020-02-19 PROCEDURE — 70460 CT HEAD/BRAIN W/DYE: CPT

## 2020-02-19 PROCEDURE — 70460 CT HEAD/BRAIN W/DYE: CPT | Mod: 26

## 2020-02-19 PROCEDURE — 96374 THER/PROPH/DIAG INJ IV PUSH: CPT | Mod: XU

## 2020-02-19 PROCEDURE — 36415 COLL VENOUS BLD VENIPUNCTURE: CPT

## 2020-02-19 PROCEDURE — 99284 EMERGENCY DEPT VISIT MOD MDM: CPT | Mod: 25

## 2020-02-19 PROCEDURE — 85027 COMPLETE CBC AUTOMATED: CPT

## 2020-02-19 PROCEDURE — 85730 THROMBOPLASTIN TIME PARTIAL: CPT

## 2020-02-19 RX ORDER — ACETAMINOPHEN 500 MG
650 TABLET ORAL ONCE
Refills: 0 | Status: COMPLETED | OUTPATIENT
Start: 2020-02-19 | End: 2020-02-19

## 2020-02-19 RX ORDER — METOCLOPRAMIDE HCL 10 MG
10 TABLET ORAL ONCE
Refills: 0 | Status: COMPLETED | OUTPATIENT
Start: 2020-02-19 | End: 2020-02-19

## 2020-02-19 RX ADMIN — Medication 650 MILLIGRAM(S): at 14:25

## 2020-02-19 RX ADMIN — Medication 10 MILLIGRAM(S): at 14:25

## 2020-02-19 NOTE — ED PROVIDER NOTE - CARE PLAN
Principal Discharge DX:	Cyst of skin  Goal:	L neck  Assessment and plan of treatment:	headache, viral sinusitis

## 2020-02-19 NOTE — ED PROVIDER NOTE - PROGRESS NOTE DETAILS
feeling better and wants to go home, still w some pain at neck where lesion located, reviewed case and results w pt, questions answered and pt understands, advised return precautions and care plan. needs to follow w ENT

## 2020-02-19 NOTE — ED PROVIDER NOTE - CARE PROVIDER_API CALL
Concha Condon)  Otolaryngology  [address] [place] [address], [address]  [place] [address]  Phone: [phone]  Fax: [phone]  Follow Up Time: 4-6 Days

## 2020-02-19 NOTE — ED PROVIDER NOTE - PATIENT PORTAL LINK FT
You can access the FollowMyHealth Patient Portal offered by [facility] by registering at the following website: [email or web address]. By joining [employer]’s FollowMyHealth portal, you will also be able to view your health information using other applications (apps) compatible with our system.

## 2020-02-19 NOTE — ED PROVIDER NOTE - OBJECTIVE STATEMENT
36 y/o F patient with a significant PMHx of Hepatic steatosis, Leukocytosis, Thrombocytosis, HTN, Parvovirus infection, and a significant PSHx of  presents s/p cholecystectomy x1 month ago. Patient notes a persistent WBC and says she is being worked up for a fatty liver by her doctor and has a MRI this week. Patient notes a slow onset of headache yesterday associated with a "ball" behind the left ear. Patient describes her pain as moderate and associated with a vague blurry area in her vision with no vision loss. Patient denies curtain dropping, double vison, difficulty swallowing, weakness to the arms or legs, and any other complaints. NKDA.

## 2020-02-19 NOTE — ED PROVIDER NOTE - CLINICAL SUMMARY MEDICAL DECISION MAKING FREE TEXT BOX
Patient symptoms not consistent with stroke. Given lymph node, check blood work. Discuss with pt may need to see ENT for biopsy. Given visual changes and recent surgery, perhaps pt is procoagulate. Obtain CT for evaluation of venus sinus thrombosis, not consistent with subarachnoid hemorrhage/ meningitis. Symptomatic treatment and reassess.

## 2020-02-19 NOTE — ED ADULT NURSE NOTE - NSIMPLEMENTINTERV_GEN_ALL_ED
Implemented All Universal Safety Interventions:  [place] to call system. Call bell, personal items and telephone within reach. Instruct patient to call for assistance. Room bathroom lighting operational. Non-slip footwear when patient is off stretcher. Physically safe environment: no spills, clutter or unnecessary equipment. Stretcher in lowest position, wheels locked, appropriate side rails in place.

## 2020-02-19 NOTE — ED PROVIDER NOTE - NEUROLOGICAL, MLM
Alert and oriented, no focal deficits, no motor or sensory deficits, normal vision in all x4 quadrants

## 2020-02-19 NOTE — ED PROVIDER NOTE - NSFOLLOWUPINSTRUCTIONS_ED_ALL_ED_FT
IMPORTANT INSTRUCTIONS FROM Dr. SOARES:    Please follow up with your personal medical doctor in 24-48 hours.   Bring results from today to your visit.    See ENT specialist as noted.    If you were advised to take any medications - be sure to review the package insert.    If your symptoms change, get worse or if you have any new symptoms, come to the ER right away.  If you have any questions, call the ER at the phone number on this page.

## 2020-03-08 ENCOUNTER — FORM ENCOUNTER (OUTPATIENT)
Age: 35
End: 2020-03-08

## 2020-03-09 ENCOUNTER — OUTPATIENT (OUTPATIENT)
Dept: OUTPATIENT SERVICES | Facility: HOSPITAL | Age: 35
LOS: 1 days | End: 2020-03-09
Payer: COMMERCIAL

## 2020-03-09 ENCOUNTER — APPOINTMENT (OUTPATIENT)
Dept: SURGICAL ONCOLOGY | Facility: CLINIC | Age: 35
End: 2020-03-09
Payer: COMMERCIAL

## 2020-03-09 VITALS
HEART RATE: 104 BPM | SYSTOLIC BLOOD PRESSURE: 126 MMHG | HEIGHT: 62 IN | TEMPERATURE: 98 F | DIASTOLIC BLOOD PRESSURE: 85 MMHG | RESPIRATION RATE: 15 BRPM | OXYGEN SATURATION: 99 % | BODY MASS INDEX: 33.68 KG/M2 | WEIGHT: 183 LBS

## 2020-03-09 DIAGNOSIS — Z98.891 HISTORY OF UTERINE SCAR FROM PREVIOUS SURGERY: Chronic | ICD-10-CM

## 2020-03-09 DIAGNOSIS — R59.1 GENERALIZED ENLARGED LYMPH NODES: ICD-10-CM

## 2020-03-09 DIAGNOSIS — Z86.69 PERSONAL HISTORY OF OTHER DISEASES OF THE NERVOUS SYSTEM AND SENSE ORGANS: ICD-10-CM

## 2020-03-09 PROCEDURE — 76536 US EXAM OF HEAD AND NECK: CPT | Mod: 26

## 2020-03-09 PROCEDURE — 99214 OFFICE O/P EST MOD 30 MIN: CPT

## 2020-03-09 PROCEDURE — 99244 OFF/OP CNSLTJ NEW/EST MOD 40: CPT

## 2020-03-09 PROCEDURE — 76536 US EXAM OF HEAD AND NECK: CPT

## 2020-03-09 RX ORDER — SODIUM FERRIC GLUCONATE COMPLEX IN SUCROSE 12.5 MG/ML
12.5 INJECTION INTRAVENOUS
Qty: 50 | Refills: 0 | Status: DISCONTINUED | COMMUNITY
Start: 2020-02-25

## 2020-03-09 RX ORDER — ONDANSETRON 4 MG/1
4 TABLET, ORALLY DISINTEGRATING ORAL
Qty: 12 | Refills: 0 | Status: DISCONTINUED | COMMUNITY
Start: 2020-01-16

## 2020-03-09 RX ORDER — OXYCODONE 5 MG/1
5 TABLET ORAL
Qty: 15 | Refills: 0 | Status: DISCONTINUED | COMMUNITY
Start: 2020-01-16

## 2020-03-09 RX ORDER — LEVOFLOXACIN 500 MG/1
500 TABLET, FILM COATED ORAL
Qty: 28 | Refills: 0 | Status: DISCONTINUED | COMMUNITY
Start: 2019-10-17

## 2020-03-09 RX ORDER — FERROUS GLUCONATE 324(38)MG
324 (38 FE) TABLET ORAL
Qty: 30 | Refills: 0 | Status: DISCONTINUED | COMMUNITY
Start: 2019-10-08

## 2020-03-09 NOTE — HISTORY OF PRESENT ILLNESS
[de-identified] : Ms. Palafox is a 35 year old female who presents today for an initial consultation.  She was referred by Dr. Victoriano Doyle.\par \par Rosa presented to [facility] Feb 2020 with a c/o headache.  During her work up which included a head CT she was noted with sinusitis (treated) and palpable cervical lymphadenopathy.  She was referred for a US neck 2/25/2020 and noted with multiple b/l cervical LNs the largest of which approached the appearance of pathologic enlargement located at level II on the right measuring 3.0cm and at level II in the left measuring 2.2cm.  Both LNs demonstrate cortical thickening.  She states that she was previously able to palpate the LNs however this has since resolved.  She has been followed by Dr. Doyle for approximately 2 years for leukocytosis and thrombocytopenia.  Work up for this is negative.  Most recent BW from [facility] noted a normal WBC count and platelet count of 421.\par \par PMH otherwise significant for HTN, \par \par Med/Onc: Victoriano Doyle MD

## 2020-03-09 NOTE — CONSULT LETTER
[Consult Letter:] : I had the pleasure of evaluating your patient, [unfilled]. [Please see my note below.] : Please see my note below. [Consult Closing:] : Thank you very much for allowing me to participate in the care of this patient.  If you have any questions, please do not hesitate to contact me. [Sincerely,] : Sincerely, [Dear  ___] : Dear ~IRON, [FreeTextEntry2] :  Victoriano Doyle MD [FreeTextEntry1] : Ms. Palafox is a 35 year old female who presents today for an initial consultation for cervical lymphadenopathy.  Roas presented to [facility] Feb 2020 with a c/o headache.  During her work up which included a head CT she was noted with sinusitis (treated) and palpable cervical lymphadenopathy.  She was referred for a US neck 2/25/2020 and noted with multiple b/l cervical LNs the largest of which approached the appearance of pathologic enlargement located at level II on the right measuring 3.0cm and at level II in the left measuring 2.2cm.  Both LNs demonstrate cortical thickening.  She states that she was previously able to palpate the LNs however this has since resolved.  She has been followed by Dr. Doyle for approximately 2 years for leukocytosis and thrombocytopenia.  Work up for this is negative.  Most recent BW from [facility] noted a normal WBC count and platelet count of 421.\par \par PMH otherwise significant for HTN, \par \par On exam, there are no palpable cervial or supreclavicular lymph nodes.\par \par I am arranging a repeat neck ultrasound.  She will follow up with me in 3 weeks to discuss findings. [FreeTextEntry3] : Jake Ahmadi MD\par Surgical Oncology\par [facility]\par Office: [phone]\par Cell: [phone]

## 2020-03-09 NOTE — ASSESSMENT
[FreeTextEntry1] : I am arranging a repeat neck ultrasound.  She will follow up with me in 3 weeks to discuss findings.

## 2020-03-31 ENCOUNTER — APPOINTMENT (OUTPATIENT)
Dept: SURGICAL ONCOLOGY | Facility: CLINIC | Age: 35
End: 2020-03-31

## 2021-04-06 ENCOUNTER — APPOINTMENT (OUTPATIENT)
Dept: OTOLARYNGOLOGY | Facility: CLINIC | Age: 36
End: 2021-04-06
Payer: COMMERCIAL

## 2021-04-06 VITALS
HEART RATE: 109 BPM | SYSTOLIC BLOOD PRESSURE: 134 MMHG | BODY MASS INDEX: 33.68 KG/M2 | DIASTOLIC BLOOD PRESSURE: 84 MMHG | WEIGHT: 183 LBS | HEIGHT: 62 IN

## 2021-04-06 DIAGNOSIS — Z86.2 PERSONAL HISTORY OF DISEASES OF THE BLOOD AND BLOOD-FORMING ORGANS AND CERTAIN DISORDERS INVOLVING THE IMMUNE MECHANISM: ICD-10-CM

## 2021-04-06 DIAGNOSIS — J35.1 HYPERTROPHY OF TONSILS: ICD-10-CM

## 2021-04-06 DIAGNOSIS — U07.1 COVID-19: ICD-10-CM

## 2021-04-06 PROCEDURE — 31575 DIAGNOSTIC LARYNGOSCOPY: CPT

## 2021-04-06 PROCEDURE — 99204 OFFICE O/P NEW MOD 45 MIN: CPT | Mod: 25

## 2021-04-06 PROCEDURE — 99072 ADDL SUPL MATRL&STAF TM PHE: CPT

## 2021-04-06 RX ORDER — RIFABUTIN 150 MG/1
150 CAPSULE ORAL
Qty: 28 | Refills: 0 | Status: COMPLETED | COMMUNITY
Start: 2020-02-04 | End: 2021-04-06

## 2021-04-06 RX ORDER — AMOXICILLIN 500 MG/1
500 CAPSULE ORAL
Qty: 56 | Refills: 0 | Status: COMPLETED | COMMUNITY
Start: 2020-02-04 | End: 2021-04-06

## 2021-04-06 RX ORDER — ACETAMINOPHEN AND CODEINE 300; 30 MG/1; MG/1
300-30 TABLET ORAL
Qty: 8 | Refills: 0 | Status: COMPLETED | COMMUNITY
Start: 2020-01-29 | End: 2021-04-06

## 2021-04-06 NOTE — DATA REVIEWED
[de-identified] : PET/CT with hypermetabolic palatine and lingual tonsils bilaterally - right side being more avid - with right level IIa LN with an SUV of 4.1.

## 2021-04-06 NOTE — HISTORY OF PRESENT ILLNESS
[de-identified] : Referred by Dr. Doyle for enlarged tonsils on PET done on 3/16/21 at [facility].  Pt is being worked up by Dr. Doyle for abnormal lab values and she had a PET as a part of the workup. Pt brought the CD with her.  PT had COVID in January and since she needs to clear her nose and throat often.  She still has a lot of congestion.  Otherwise, pt feels well.   She denies any fevers, chills or night sweats.  She denies any weight loss.  She has actually gained quite a bit of weight over the past couple of years which she attributes to depression after the loss of her father.  She reports frequent symptoms of reflux.

## 2021-04-06 NOTE — PROCEDURE
[Lesion] : lesion identified by mirror examination needing further evaluation [None] : none [Flexible Endoscope] : examined with the flexible endoscope [Serial Number: ___] : Serial Number: [unfilled] [de-identified] : No lesions in the NPx, HPx or larynx.  Exam of the OPx with enlarged L. tonsil - more exophytic? when compared to right.  Symmetric bilateral lingual tonsillar hypertrophy is noted.  VC are mobile, airway patent.  Patient with significant changes of LPRD on exam with interarytenoid pachyderma and edema.\par

## 2021-04-06 NOTE — PHYSICAL EXAM
[de-identified] : No obvious pathologic LAD is appreciated. [Laryngoscopy Performed] : laryngoscopy was performed, see procedure section for findings [FreeTextEntry1] : Asymmetric L. tonsil - 3+.  Not particularly firm, no TTP.  No other concerning lesions in the OC/OPx on inspection or palpation.\par  [Normal] : no rashes

## 2021-04-06 NOTE — CONSULT LETTER
[Dear  ___] : Dear ~IRON, [Consult Letter:] : I had the pleasure of evaluating your patient, [unfilled]. [Please see my note below.] : Please see my note below. [Consult Closing:] : Thank you very much for allowing me to participate in the care of this patient.  If you have any questions, please do not hesitate to contact me. [Sincerely,] : Sincerely, [FreeTextEntry2] : Dr. Victoriano Doyle\[address]\[place] [id number] [FreeTextEntry3] : Dev

## 2021-05-05 ENCOUNTER — OUTPATIENT (OUTPATIENT)
Dept: OUTPATIENT SERVICES | Facility: HOSPITAL | Age: 36
LOS: 1 days | End: 2021-05-05
Payer: COMMERCIAL

## 2021-05-05 ENCOUNTER — APPOINTMENT (OUTPATIENT)
Dept: CT IMAGING | Facility: IMAGING CENTER | Age: 36
End: 2021-05-05
Payer: COMMERCIAL

## 2021-05-05 DIAGNOSIS — Z00.8 ENCOUNTER FOR OTHER GENERAL EXAMINATION: ICD-10-CM

## 2021-05-05 DIAGNOSIS — J35.1 HYPERTROPHY OF TONSILS: ICD-10-CM

## 2021-05-05 DIAGNOSIS — R59.1 GENERALIZED ENLARGED LYMPH NODES: ICD-10-CM

## 2021-05-05 DIAGNOSIS — Z98.891 HISTORY OF UTERINE SCAR FROM PREVIOUS SURGERY: Chronic | ICD-10-CM

## 2021-05-05 PROCEDURE — 82565 ASSAY OF CREATININE: CPT

## 2021-05-05 PROCEDURE — 70491 CT SOFT TISSUE NECK W/DYE: CPT

## 2021-05-05 PROCEDURE — 70491 CT SOFT TISSUE NECK W/DYE: CPT | Mod: 26

## 2021-05-18 ENCOUNTER — TRANSCRIPTION ENCOUNTER (OUTPATIENT)
Age: 36
End: 2021-05-18

## 2021-06-08 ENCOUNTER — APPOINTMENT (OUTPATIENT)
Dept: OTOLARYNGOLOGY | Facility: CLINIC | Age: 36
End: 2021-06-08
Payer: COMMERCIAL

## 2021-06-08 VITALS
WEIGHT: 189 LBS | HEART RATE: 102 BPM | BODY MASS INDEX: 34.78 KG/M2 | SYSTOLIC BLOOD PRESSURE: 131 MMHG | DIASTOLIC BLOOD PRESSURE: 90 MMHG | HEIGHT: 62 IN

## 2021-06-08 PROCEDURE — 99072 ADDL SUPL MATRL&STAF TM PHE: CPT

## 2021-06-08 PROCEDURE — 99214 OFFICE O/P EST MOD 30 MIN: CPT | Mod: 25

## 2021-06-08 PROCEDURE — 31575 DIAGNOSTIC LARYNGOSCOPY: CPT

## 2021-06-08 NOTE — CONSULT LETTER
[Dear  ___] : Dear ~IRON, [Courtesy Letter:] : I had the pleasure of seeing your patient, [unfilled], in my office today. [Please see my note below.] : Please see my note below. [Consult Closing:] : Thank you very much for allowing me to participate in the care of this patient.  If you have any questions, please do not hesitate to contact me. [Sincerely,] : Sincerely, [FreeTextEntry2] : Dr. Victoriano Doyle\[address]\[place] [id number]  [FreeTextEntry3] : Dev

## 2021-06-08 NOTE — REASON FOR VISIT
[Subsequent Evaluation] : a subsequent evaluation for [FreeTextEntry2] : follow up after cat scan May 5, 2021, for enlarged tonsils

## 2021-06-08 NOTE — PROCEDURE
[Lesion] : lesion identified by mirror examination needing further evaluation [None] : none [Flexible Endoscope] : examined with the flexible endoscope [Serial Number: ___] : Serial Number: [unfilled] [de-identified] : No lesions in the NPx, HPx or larynx. Exam of the OPx with enlarged L. tonsil when compared to right. Symmetric bilateral lingual tonsillar hypertrophy is noted. VC are mobile, airway patent. Patient with significant changes of LPRD on exam with interarytenoid pachyderma and edema.\par

## 2021-06-08 NOTE — HISTORY OF PRESENT ILLNESS
[de-identified] : 36 year old female follow up after cat scan May 5, 2021, for enlarged tonsils.  Impression: prominent palatine tonsils left greater than right and prominent lingual tonsillar tissue.  While this may represent tonsillar hypertrophy, the possibility of tumor involvement is not excluded and correlation with direct visualization is advised.  Mildly enlarged bilateral level II lymph nodes. \par States has been taking the Omeprazole as prescribed.  Denies any change in tonsils since last visit April 6, 2021.  Denies dysphagia, odynophagia, dyspnea, dysphonia.  Denies fevers, chills, night sweats, or weight loss.

## 2021-06-08 NOTE — PHYSICAL EXAM
[Laryngoscopy Performed] : laryngoscopy was performed, see procedure section for findings [Normal] : no rashes [de-identified] : No obvious pathologic LAD is appreciated. [FreeTextEntry1] : Asymmetric L. tonsil - 3+.  Not particularly firm, no TTP.  No other concerning lesions in the OC/OPx on inspection or palpation.\par

## 2021-06-11 ENCOUNTER — OUTPATIENT (OUTPATIENT)
Dept: OUTPATIENT SERVICES | Facility: HOSPITAL | Age: 36
LOS: 1 days | End: 2021-06-11
Payer: COMMERCIAL

## 2021-06-11 VITALS
OXYGEN SATURATION: 99 % | TEMPERATURE: 97 F | HEIGHT: 62 IN | HEART RATE: 84 BPM | DIASTOLIC BLOOD PRESSURE: 80 MMHG | SYSTOLIC BLOOD PRESSURE: 130 MMHG | WEIGHT: 197.98 LBS | RESPIRATION RATE: 16 BRPM

## 2021-06-11 DIAGNOSIS — C09.9 MALIGNANT NEOPLASM OF TONSIL, UNSPECIFIED: ICD-10-CM

## 2021-06-11 DIAGNOSIS — Z90.49 ACQUIRED ABSENCE OF OTHER SPECIFIED PARTS OF DIGESTIVE TRACT: Chronic | ICD-10-CM

## 2021-06-11 DIAGNOSIS — Z98.891 HISTORY OF UTERINE SCAR FROM PREVIOUS SURGERY: Chronic | ICD-10-CM

## 2021-06-11 LAB
ALBUMIN SERPL ELPH-MCNC: 4.4 G/DL — SIGNIFICANT CHANGE UP (ref 3.3–5)
ALP SERPL-CCNC: 96 U/L — SIGNIFICANT CHANGE UP (ref 40–120)
ALT FLD-CCNC: 26 U/L — SIGNIFICANT CHANGE UP (ref 4–33)
ANION GAP SERPL CALC-SCNC: 14 MMOL/L — SIGNIFICANT CHANGE UP (ref 7–14)
AST SERPL-CCNC: 16 U/L — SIGNIFICANT CHANGE UP (ref 4–32)
BILIRUB SERPL-MCNC: 0.3 MG/DL — SIGNIFICANT CHANGE UP (ref 0.2–1.2)
BUN SERPL-MCNC: 9 MG/DL — SIGNIFICANT CHANGE UP (ref 7–23)
CALCIUM SERPL-MCNC: 9.3 MG/DL — SIGNIFICANT CHANGE UP (ref 8.4–10.5)
CHLORIDE SERPL-SCNC: 101 MMOL/L — SIGNIFICANT CHANGE UP (ref 98–107)
CO2 SERPL-SCNC: 21 MMOL/L — LOW (ref 22–31)
CREAT SERPL-MCNC: 0.5 MG/DL — SIGNIFICANT CHANGE UP (ref 0.5–1.3)
GLUCOSE SERPL-MCNC: 98 MG/DL — SIGNIFICANT CHANGE UP (ref 70–99)
HCG UR QL: NEGATIVE — SIGNIFICANT CHANGE UP
HCT VFR BLD CALC: 40 % — SIGNIFICANT CHANGE UP (ref 34.5–45)
HGB BLD-MCNC: 12.9 G/DL — SIGNIFICANT CHANGE UP (ref 11.5–15.5)
MCHC RBC-ENTMCNC: 28.2 PG — SIGNIFICANT CHANGE UP (ref 27–34)
MCHC RBC-ENTMCNC: 32.3 GM/DL — SIGNIFICANT CHANGE UP (ref 32–36)
MCV RBC AUTO: 87.3 FL — SIGNIFICANT CHANGE UP (ref 80–100)
NRBC # BLD: 0 /100 WBCS — SIGNIFICANT CHANGE UP
NRBC # FLD: 0 K/UL — SIGNIFICANT CHANGE UP
PLATELET # BLD AUTO: 428 K/UL — HIGH (ref 150–400)
POTASSIUM SERPL-MCNC: 3.7 MMOL/L — SIGNIFICANT CHANGE UP (ref 3.5–5.3)
POTASSIUM SERPL-SCNC: 3.7 MMOL/L — SIGNIFICANT CHANGE UP (ref 3.5–5.3)
PROT SERPL-MCNC: 8.2 G/DL — SIGNIFICANT CHANGE UP (ref 6–8.3)
RBC # BLD: 4.58 M/UL — SIGNIFICANT CHANGE UP (ref 3.8–5.2)
RBC # FLD: 12.7 % — SIGNIFICANT CHANGE UP (ref 10.3–14.5)
SODIUM SERPL-SCNC: 136 MMOL/L — SIGNIFICANT CHANGE UP (ref 135–145)
WBC # BLD: 10.69 K/UL — HIGH (ref 3.8–10.5)
WBC # FLD AUTO: 10.69 K/UL — HIGH (ref 3.8–10.5)

## 2021-06-11 PROCEDURE — 93010 ELECTROCARDIOGRAM REPORT: CPT

## 2021-06-11 RX ORDER — FERROUS SULFATE 325(65) MG
0 TABLET ORAL
Qty: 0 | Refills: 0 | DISCHARGE

## 2021-06-11 RX ORDER — OMEPRAZOLE 10 MG/1
1 CAPSULE, DELAYED RELEASE ORAL
Qty: 0 | Refills: 0 | DISCHARGE

## 2021-06-11 RX ORDER — SODIUM CHLORIDE 9 MG/ML
1000 INJECTION, SOLUTION INTRAVENOUS
Refills: 0 | Status: DISCONTINUED | OUTPATIENT
Start: 2021-06-16 | End: 2021-07-01

## 2021-06-11 NOTE — H&P PST ADULT - NSICDXPROBLEM_GEN_ALL_CORE_FT
PROBLEM DIAGNOSES  Problem: Malignant neoplasm of tonsil  Assessment and Plan: pt scheduled for excisional biopsy left neck node on 06/16/21  Preop instructions provided. Pt verbalized understanding.   Pepcid for GI prophylaxis with written and verbal instruction provided    written and verbal instructions with teach back on chlorhexidine shampoo provided,  pt verbalized understanding with returned demonstration   s/p COVID vaccine x 2 doses, copy of the card in chart       PROBLEM DIAGNOSES  Problem: Malignant neoplasm of tonsil  Assessment and Plan: pt scheduled for excisional biopsy left neck node on 06/16/21  Preop instructions provided. Pt verbalized understanding.   pt to take omeprazole for GI prophylaxis   written and verbal instructions with teach back on chlorhexidine shampoo provided,  pt verbalized understanding with returned demonstration   s/p COVID vaccine x 2 doses, copy of the card in chart

## 2021-06-11 NOTE — H&P PST ADULT - OTHER CARE PROVIDERS
Dr. Victoriano Doyle,oncologist - [facility] Dr. Victoriano Doyle,oncologist [phone]

## 2021-06-11 NOTE — H&P PST ADULT - HISTORY OF PRESENT ILLNESS
36 y.o. female with h/o HTN, reports h/o abnormal blood test results, s/p CT and PET scan, presents to PST with preop diagnosis of malignant neoplasm of tonsil, scheduled for excisional biopsy left neck node,  denies pain, dysphagia, weight loss, fatigue 36 y.o. female with h/o HTN, reports h/o s/p CT and PET scan for enlarged tonsils, presents to PST with preop diagnosis of malignant neoplasm of tonsil, scheduled for excisional biopsy left neck node, denies pain, dysphagia, weight loss, fatigue 36 y.o. female with h/o HTN, reports h/o enlarged tonsils, s/p CT and PET scan, reports enlarged lymph nodes noted, presents to PST with preop diagnosis of malignant neoplasm of tonsil, scheduled for excisional biopsy left neck node, denies pain, dysphagia, weight loss, fatigue

## 2021-06-11 NOTE — H&P PST ADULT - NSICDXFAMILYHX_GEN_ALL_CORE_FT
FAMILY HISTORY:  Family history of hypertension, mother and father  Family history of stroke, father

## 2021-06-11 NOTE — H&P PST ADULT - NSICDXPASTMEDICALHX_GEN_ALL_CORE_FT
PAST MEDICAL HISTORY:  Hepatic steatosis     History of leukocytosis     History of thrombocytosis     HTN (hypertension)     Malignant neoplasm of tonsil     Parvovirus infection

## 2021-06-15 ENCOUNTER — TRANSCRIPTION ENCOUNTER (OUTPATIENT)
Age: 36
End: 2021-06-15

## 2021-06-15 NOTE — ASU PATIENT PROFILE, ADULT - PMH
Hepatic steatosis    History of leukocytosis    History of thrombocytosis    HTN (hypertension)    Malignant neoplasm of tonsil    Parvovirus infection

## 2021-06-16 ENCOUNTER — APPOINTMENT (OUTPATIENT)
Dept: OTOLARYNGOLOGY | Facility: HOSPITAL | Age: 36
End: 2021-06-16

## 2021-06-16 ENCOUNTER — OUTPATIENT (OUTPATIENT)
Dept: OUTPATIENT SERVICES | Facility: HOSPITAL | Age: 36
LOS: 1 days | Discharge: HOME | End: 2021-06-16
Payer: COMMERCIAL

## 2021-06-16 ENCOUNTER — RESULT REVIEW (OUTPATIENT)
Age: 36
End: 2021-06-16

## 2021-06-16 VITALS
HEART RATE: 107 BPM | OXYGEN SATURATION: 100 % | SYSTOLIC BLOOD PRESSURE: 123 MMHG | RESPIRATION RATE: 17 BRPM | DIASTOLIC BLOOD PRESSURE: 86 MMHG

## 2021-06-16 VITALS
HEART RATE: 99 BPM | HEIGHT: 62 IN | TEMPERATURE: 99 F | WEIGHT: 197.98 LBS | RESPIRATION RATE: 16 BRPM | OXYGEN SATURATION: 99 % | DIASTOLIC BLOOD PRESSURE: 89 MMHG | SYSTOLIC BLOOD PRESSURE: 128 MMHG

## 2021-06-16 DIAGNOSIS — Z98.891 HISTORY OF UTERINE SCAR FROM PREVIOUS SURGERY: Chronic | ICD-10-CM

## 2021-06-16 DIAGNOSIS — C09.9 MALIGNANT NEOPLASM OF TONSIL, UNSPECIFIED: ICD-10-CM

## 2021-06-16 DIAGNOSIS — Z90.49 ACQUIRED ABSENCE OF OTHER SPECIFIED PARTS OF DIGESTIVE TRACT: Chronic | ICD-10-CM

## 2021-06-16 LAB — HCG UR QL: NEGATIVE — SIGNIFICANT CHANGE UP

## 2021-06-16 PROCEDURE — 88360 TUMOR IMMUNOHISTOCHEM/MANUAL: CPT | Mod: 26

## 2021-06-16 PROCEDURE — 88365 INSITU HYBRIDIZATION (FISH): CPT | Mod: 26,59

## 2021-06-16 PROCEDURE — 88367 INSITU HYBRIDIZATION AUTO: CPT | Mod: 26

## 2021-06-16 PROCEDURE — 38510 BIOPSY/REMOVAL LYMPH NODES: CPT | Mod: GC,LT

## 2021-06-16 PROCEDURE — 88189 FLOWCYTOMETRY/READ 16 & >: CPT

## 2021-06-16 PROCEDURE — 88341 IMHCHEM/IMCYTCHM EA ADD ANTB: CPT | Mod: 26,59

## 2021-06-16 PROCEDURE — 88342 IMHCHEM/IMCYTCHM 1ST ANTB: CPT | Mod: 26,59

## 2021-06-16 RX ORDER — FENTANYL CITRATE 50 UG/ML
25 INJECTION INTRAVENOUS
Refills: 0 | Status: DISCONTINUED | OUTPATIENT
Start: 2021-06-16 | End: 2021-06-16

## 2021-06-16 RX ORDER — FENTANYL CITRATE 50 UG/ML
50 INJECTION INTRAVENOUS
Refills: 0 | Status: DISCONTINUED | OUTPATIENT
Start: 2021-06-16 | End: 2021-06-16

## 2021-06-16 RX ORDER — AMLODIPINE BESYLATE 2.5 MG/1
1 TABLET ORAL
Qty: 0 | Refills: 0 | DISCHARGE

## 2021-06-16 RX ORDER — ACETAMINOPHEN 500 MG
2 TABLET ORAL
Qty: 0 | Refills: 0 | DISCHARGE

## 2021-06-16 RX ORDER — OMEPRAZOLE 10 MG/1
1 CAPSULE, DELAYED RELEASE ORAL
Qty: 0 | Refills: 0 | DISCHARGE

## 2021-06-16 RX ORDER — ONDANSETRON 8 MG/1
4 TABLET, FILM COATED ORAL ONCE
Refills: 0 | Status: DISCONTINUED | OUTPATIENT
Start: 2021-06-16 | End: 2021-07-01

## 2021-06-16 RX ADMIN — FENTANYL CITRATE 25 MICROGRAM(S): 50 INJECTION INTRAVENOUS at 21:38

## 2021-06-16 NOTE — ASU DISCHARGE PLAN (ADULT/PEDIATRIC) - NURSING INSTRUCTIONS
DO NOT take any Tylenol (Acetaminophen) or narcotics containing Tylenol until after  12AM . You received Tylenol during your operation and it can cause damage to your liver if too much is taken within a 24 hour time period.

## 2021-06-16 NOTE — ASU DISCHARGE PLAN (ADULT/PEDIATRIC) - ASU DC SPECIAL INSTRUCTIONSFT
REgular diet as tolerated.   No heavy lifting or vigorous activity for 2 weeks.   Pain control with tylenol 650 alternating with motrin 600mg every 6 hours. (ex: tylenol 9a, motrin 12p, tylenol 3p, etc)  Shower 2 days after surgery, let water run over incision site, do not scrub, pat dry after shower. Bacitracin to incision site 2 times per day for 3 days.   Follow up with Dr. Santos for suture removal.

## 2021-06-16 NOTE — ASU DISCHARGE PLAN (ADULT/PEDIATRIC) - CARE PROVIDER_API CALL
Praful Santos)  Otolaryngology  [address] [place] [address]  Phone: [phone]  Fax: [phone]  Follow Up Time:

## 2021-06-17 ENCOUNTER — OUTPATIENT (OUTPATIENT)
Dept: OUTPATIENT SERVICES | Facility: HOSPITAL | Age: 36
LOS: 1 days | End: 2021-06-17
Payer: COMMERCIAL

## 2021-06-17 DIAGNOSIS — C85.90 NON-HODGKIN LYMPHOMA, UNSPECIFIED, UNSPECIFIED SITE: ICD-10-CM

## 2021-06-17 DIAGNOSIS — Z90.49 ACQUIRED ABSENCE OF OTHER SPECIFIED PARTS OF DIGESTIVE TRACT: Chronic | ICD-10-CM

## 2021-06-17 DIAGNOSIS — Z98.891 HISTORY OF UTERINE SCAR FROM PREVIOUS SURGERY: Chronic | ICD-10-CM

## 2021-06-17 PROBLEM — C09.9 MALIGNANT NEOPLASM OF TONSIL, UNSPECIFIED: Chronic | Status: ACTIVE | Noted: 2021-06-11

## 2021-06-17 PROCEDURE — 88285 CHROMOSOME COUNT ADDITIONAL: CPT

## 2021-06-17 PROCEDURE — 88264 CHROMOSOME ANALYSIS 20-25: CPT

## 2021-06-17 PROCEDURE — 88280 CHROMOSOME KARYOTYPE STUDY: CPT

## 2021-06-17 PROCEDURE — 88237 TISSUE CULTURE BONE MARROW: CPT

## 2021-06-19 ENCOUNTER — EMERGENCY (EMERGENCY)
Facility: HOSPITAL | Age: 36
LOS: 1 days | Discharge: HOME | End: 2021-06-19
Attending: EMERGENCY MEDICINE | Admitting: EMERGENCY MEDICINE
Payer: COMMERCIAL

## 2021-06-19 VITALS
RESPIRATION RATE: 18 BRPM | SYSTOLIC BLOOD PRESSURE: 141 MMHG | HEART RATE: 97 BPM | OXYGEN SATURATION: 100 % | DIASTOLIC BLOOD PRESSURE: 89 MMHG | TEMPERATURE: 99 F

## 2021-06-19 VITALS
HEIGHT: 62 IN | TEMPERATURE: 98 F | RESPIRATION RATE: 16 BRPM | HEART RATE: 100 BPM | SYSTOLIC BLOOD PRESSURE: 136 MMHG | DIASTOLIC BLOOD PRESSURE: 88 MMHG | OXYGEN SATURATION: 100 %

## 2021-06-19 DIAGNOSIS — Z98.891 HISTORY OF UTERINE SCAR FROM PREVIOUS SURGERY: Chronic | ICD-10-CM

## 2021-06-19 DIAGNOSIS — Z90.49 ACQUIRED ABSENCE OF OTHER SPECIFIED PARTS OF DIGESTIVE TRACT: Chronic | ICD-10-CM

## 2021-06-19 LAB
ALBUMIN SERPL ELPH-MCNC: 4.3 G/DL — SIGNIFICANT CHANGE UP (ref 3.3–5)
ALP SERPL-CCNC: 94 U/L — SIGNIFICANT CHANGE UP (ref 40–120)
ALT FLD-CCNC: 27 U/L — SIGNIFICANT CHANGE UP (ref 4–33)
ANION GAP SERPL CALC-SCNC: 16 MMOL/L — HIGH (ref 7–14)
AST SERPL-CCNC: 15 U/L — SIGNIFICANT CHANGE UP (ref 4–32)
B PERT DNA SPEC QL NAA+PROBE: SIGNIFICANT CHANGE UP
BILIRUB SERPL-MCNC: 0.3 MG/DL — SIGNIFICANT CHANGE UP (ref 0.2–1.2)
BLD GP AB SCN SERPL QL: NEGATIVE — SIGNIFICANT CHANGE UP
BUN SERPL-MCNC: 10 MG/DL — SIGNIFICANT CHANGE UP (ref 7–23)
C PNEUM DNA SPEC QL NAA+PROBE: SIGNIFICANT CHANGE UP
CALCIUM SERPL-MCNC: 9.7 MG/DL — SIGNIFICANT CHANGE UP (ref 8.4–10.5)
CHLORIDE SERPL-SCNC: 98 MMOL/L — SIGNIFICANT CHANGE UP (ref 98–107)
CO2 SERPL-SCNC: 24 MMOL/L — SIGNIFICANT CHANGE UP (ref 22–31)
CREAT SERPL-MCNC: 0.49 MG/DL — LOW (ref 0.5–1.3)
FLUAV SUBTYP SPEC NAA+PROBE: SIGNIFICANT CHANGE UP
FLUBV RNA SPEC QL NAA+PROBE: SIGNIFICANT CHANGE UP
GLUCOSE SERPL-MCNC: 90 MG/DL — SIGNIFICANT CHANGE UP (ref 70–99)
HADV DNA SPEC QL NAA+PROBE: SIGNIFICANT CHANGE UP
HCG SERPL-ACNC: <5 MIU/ML — SIGNIFICANT CHANGE UP
HCOV 229E RNA SPEC QL NAA+PROBE: SIGNIFICANT CHANGE UP
HCOV HKU1 RNA SPEC QL NAA+PROBE: SIGNIFICANT CHANGE UP
HCOV NL63 RNA SPEC QL NAA+PROBE: SIGNIFICANT CHANGE UP
HCOV OC43 RNA SPEC QL NAA+PROBE: SIGNIFICANT CHANGE UP
HCT VFR BLD CALC: 39.3 % — SIGNIFICANT CHANGE UP (ref 34.5–45)
HGB BLD-MCNC: 12.8 G/DL — SIGNIFICANT CHANGE UP (ref 11.5–15.5)
HMPV RNA SPEC QL NAA+PROBE: SIGNIFICANT CHANGE UP
HPIV1 RNA SPEC QL NAA+PROBE: SIGNIFICANT CHANGE UP
HPIV2 RNA SPEC QL NAA+PROBE: SIGNIFICANT CHANGE UP
HPIV3 RNA SPEC QL NAA+PROBE: SIGNIFICANT CHANGE UP
HPIV4 RNA SPEC QL NAA+PROBE: SIGNIFICANT CHANGE UP
MCHC RBC-ENTMCNC: 28.8 PG — SIGNIFICANT CHANGE UP (ref 27–34)
MCHC RBC-ENTMCNC: 32.6 GM/DL — SIGNIFICANT CHANGE UP (ref 32–36)
MCV RBC AUTO: 88.3 FL — SIGNIFICANT CHANGE UP (ref 80–100)
NRBC # BLD: 0 /100 WBCS — SIGNIFICANT CHANGE UP
NRBC # FLD: 0 K/UL — SIGNIFICANT CHANGE UP
PLATELET # BLD AUTO: 457 K/UL — HIGH (ref 150–400)
POTASSIUM SERPL-MCNC: 4.3 MMOL/L — SIGNIFICANT CHANGE UP (ref 3.5–5.3)
POTASSIUM SERPL-SCNC: 4.3 MMOL/L — SIGNIFICANT CHANGE UP (ref 3.5–5.3)
PROT SERPL-MCNC: 8.1 G/DL — SIGNIFICANT CHANGE UP (ref 6–8.3)
RAPID RVP RESULT: SIGNIFICANT CHANGE UP
RBC # BLD: 4.45 M/UL — SIGNIFICANT CHANGE UP (ref 3.8–5.2)
RBC # FLD: 13 % — SIGNIFICANT CHANGE UP (ref 10.3–14.5)
RH IG SCN BLD-IMP: POSITIVE — SIGNIFICANT CHANGE UP
RSV RNA SPEC QL NAA+PROBE: SIGNIFICANT CHANGE UP
RV+EV RNA SPEC QL NAA+PROBE: SIGNIFICANT CHANGE UP
SARS-COV-2 RNA SPEC QL NAA+PROBE: SIGNIFICANT CHANGE UP
SODIUM SERPL-SCNC: 138 MMOL/L — SIGNIFICANT CHANGE UP (ref 135–145)
WBC # BLD: 16.82 K/UL — HIGH (ref 3.8–10.5)
WBC # FLD AUTO: 16.82 K/UL — HIGH (ref 3.8–10.5)

## 2021-06-19 PROCEDURE — 99284 EMERGENCY DEPT VISIT MOD MDM: CPT

## 2021-06-19 PROCEDURE — 71046 X-RAY EXAM CHEST 2 VIEWS: CPT | Mod: 26

## 2021-06-19 RX ORDER — MORPHINE SULFATE 50 MG/1
4 CAPSULE, EXTENDED RELEASE ORAL ONCE
Refills: 0 | Status: DISCONTINUED | OUTPATIENT
Start: 2021-06-19 | End: 2021-06-19

## 2021-06-19 RX ORDER — SODIUM CHLORIDE 9 MG/ML
1000 INJECTION, SOLUTION INTRAVENOUS ONCE
Refills: 0 | Status: COMPLETED | OUTPATIENT
Start: 2021-06-19 | End: 2021-06-19

## 2021-06-19 RX ORDER — DEXAMETHASONE 0.5 MG/5ML
10 ELIXIR ORAL ONCE
Refills: 0 | Status: COMPLETED | OUTPATIENT
Start: 2021-06-19 | End: 2021-06-19

## 2021-06-19 RX ADMIN — SODIUM CHLORIDE 1000 MILLILITER(S): 9 INJECTION, SOLUTION INTRAVENOUS at 15:46

## 2021-06-19 RX ADMIN — MORPHINE SULFATE 4 MILLIGRAM(S): 50 CAPSULE, EXTENDED RELEASE ORAL at 15:46

## 2021-06-19 RX ADMIN — Medication 102 MILLIGRAM(S): at 16:11

## 2021-06-19 NOTE — ED ADULT TRIAGE NOTE - CHIEF COMPLAINT QUOTE
lymph node biopsy wed l side of neck. c/o increased pain to area with l ear pain ,upper ch pains. c/o pain upon swalllowing. swelling to area . no redness noted. denies fever. states " feels not getting enough air" lymph node biopsy wed l side of neck. c/o increased pain to area with l ear pain ,upper ch pains. c/o pain upon swalllowing. swelling to area . no redness noted. denies fever. states " feels not getting enough air"  no drooling,resp distress noted

## 2021-06-19 NOTE — ED ADULT NURSE REASSESSMENT NOTE - NS ED NURSE REASSESS COMMENT FT1
Pt states her abdominal pain subsided on its own. Endorses "some" throat pain. CASH Kennedy made aware. Awaiting new orders.

## 2021-06-19 NOTE — CONSULT NOTE ADULT - SUBJECTIVE AND OBJECTIVE BOX
HPI:  36F s/p excisional LN bx 6/16 for lymphoma work up. Last night, POD2, patient began to experience worsening odynophagia, L neck swelling, SOB. She denies fever. Last PO was at 8:30 when she took tylenol. She is able to tolerate liquids, having more difficulty with solids. She is tolerating her secretions. She denies any recent abx use, hx of allergic reactions.        T(C): 36.8 (06-19-21 @ 13:31), Max: 36.8 (06-19-21 @ 13:31)  HR: 100 (06-19-21 @ 13:31) (100 - 100)  BP: 136/88 (06-19-21 @ 13:31) (136/88 - 136/88)  RR: 16 (06-19-21 @ 13:31) (16 - 16)  SpO2: 100% (06-19-21 @ 13:31) (100% - 100%)  NAD, AOx3  No respiratory distress, stridor, stertor on RA  Voice quality: hoarse  PERRL, EOMI  Nose: bilateral NC clear anteriorly, IT normal, mucosa normal  OC/OP: tongue and FOM soft, no lesions, OP clear  Neck: L neck swelling, incision thin and well approximated, running suture intact, no erythema or induration. soft.   CN II-XII intact        FOE: NC/NP: bilateral IT and MT normal in appearance. mucosa normal, no lesions. NP clear  OP: BOT and tonsils normal, no lesions. Mucosa normal.  Suprglottis: epiglottis sharp, trace L AE fold edema, L Arytenoid and piriform sinus with water edema.  Glottis: TVC fully mobile bilaterally, no lesions, airway grossly patent.  Subglottis: clear, no lesions  Hypopharynx: No pooling of secretions, mucosa normal, no lesions.        A/P:  36F s/p L cervical LN bx 6/16 p/w 1 day history of acute neck edema overlying surgical site, odynophagia, hoarseness, chest tightness. Neck swelling soft with no overlying skin changes. Tolerating secretions and no extra WOB. Fiberoptic exam with watery edema of L piriform sinus and lateral edge of L arytenoid, but airway widely patent. Edema likely reactive from LN bx. Infectious etiology low on ddx.   -decadron 10mg x1  -fu cbc  -will fu cxr  -no CT  -if labs wnl, and otherwise no signs of infection, OK to discharge with medrol dosepak with return instructions if symptoms worsen/fail to improve.  -PO trial prior to dc   -please page with questions  -discussed case with attending Dr. Santos.

## 2021-06-19 NOTE — ED PROVIDER NOTE - NS ED ROS FT
Constitutional: (-) Fever, (-) Chills, (-) Anorexia  Skin: (-) Color changes, (-) Rashes, (-) Wounds  Eyes: (-) Visual changes, (-) Discharge, (-) Redness  Ears: (-) Hearing loss, (-)Tinnitus, (-) Ear pain  Nose: (-) Nasal congestion, (-) Runny nose  Mouth/Throat: (+) Vocal changes, (-) Sore throat  Neck: (+) Pain, (-) Stiffness, (-) Limited range of motion  CV: (-) Chest pain, (-) Palpitations, (-) Diaphoresis, (-) Extremity Swelling, (-) Syncope  Resp: (-) Cough, (-) Shortness of breath, (-) Dyspnea on Exertion, (-) Wheezing  GI: (-) Abdominal pain, (-) Nausea, (-) Vomiting, (-) Diarrhea  : (-) Dysuria, (-) Hematuria, (-) Increased frequency  MSK: (-) Weakness, (-) Myalgias, (-) Back pain, (-) Calf pain, (-) Neck pain  Neuro: (-) Headache

## 2021-06-19 NOTE — ED ADULT NURSE NOTE - CHIEF COMPLAINT QUOTE
lymph node biopsy wed l side of neck. c/o increased pain to area with l ear pain ,upper ch pains. c/o pain upon swalllowing. swelling to area . no redness noted. denies fever. states " feels not getting enough air"  no drooling,resp distress noted

## 2021-06-19 NOTE — ED PROVIDER NOTE - CLINICAL SUMMARY MEDICAL DECISION MAKING FREE TEXT BOX
36y Female with PMHx of HTN presents to the ER for neck pain. s/p left neck biopsy on 6/16. Will get labs, pain control, ENT consult, reassess.

## 2021-06-19 NOTE — ED ADULT NURSE NOTE - OBJECTIVE STATEMENT
Pt received to room 17. Pt comes to ED c/o throat pain s/p removal of a lymph node to the left side of her throat from 6/16. Pt reports feeling pain, difficulty swallowing, and difficulty breathing due to pain. ENT at bedside states that airway is patent. No obvious redness/bruising noted to surgical side. Pt speaks in full sentences without difficulty. Respirations are even & unlabored. Denies chest pain, dyspnea, N/V/D, chills, fever, weakness, dizziness, headache, palpitations, cough. Pt is A&Ox4, ambulates independently. 20 G IV placed to right AC. Bed is in lowest position, call bell within  reach & pt verbalizes understanding on how to use call bell.  at bedside.

## 2021-06-19 NOTE — ED PROVIDER NOTE - ATTENDING CONTRIBUTION TO CARE
Dr Bloch, ATTENDING MD-  I performed a face to face bedside interview with patient regarding history of present illness, review of symptoms and past medical history. I completed an independent physical exam.  I have discussed patient's plan of care with PA.   Documentation as above in the note.  Patient examined, well appearing NAd mild discomfort, left side neck- pos swelling tenderness, no redness or heat, no tracheal deviation. heart s1s2 lungs clear, abd soft extrem neg, neuro nml, voice mild hoarsness, swallowing saliva

## 2021-06-19 NOTE — ED ADULT NURSE REASSESSMENT NOTE - NS ED NURSE REASSESS COMMENT FT1
Pt endorsing new onset of abdominal pain. Dr. Porter & CASH Kennedy made aware. Awaiting PA assessment and new orders.

## 2021-06-19 NOTE — CHART NOTE - NSCHARTNOTEFT_GEN_A_CORE
ENT brief note:  Patient re-evaluated. WBC elevated but otherwise no clinical signs of infection. Patient with mild symptom improvement after decadron. Tolerating PO.   -CXR; to r.o pulm etiology of chest tightness  -OK for discharge home with medrol dosepak from ENT perspective.  -Patient to call clinic monday for appt with Dr. Santos on Tuesday.   -D/w Dr. Santos

## 2021-06-19 NOTE — ED ADULT NURSE NOTE - NSIMPLEMENTINTERV_GEN_ALL_ED
Applied Implemented All Universal Safety Interventions:  [place] to call system. Call bell, personal items and telephone within reach. Instruct patient to call for assistance. Room bathroom lighting operational. Non-slip footwear when patient is off stretcher. Physically safe environment: no spills, clutter or unnecessary equipment. Stretcher in lowest position, wheels locked, appropriate side rails in place.

## 2021-06-19 NOTE — ED PROVIDER NOTE - OBJECTIVE STATEMENT
36y Female with PMHx of HTN presents to the ER for neck pain. Patient reports she has been followed by oncology for elevated white blood cell count and recently had a biopsy for an enlarged lymph node on 06/16/21. Since the biopsy, patient has had worsening of left sided neck pain, swelling, painful swallowing, difficulty breathing and changes in voice. Denies fever, chills.

## 2021-06-19 NOTE — ED PROVIDER NOTE - PATIENT PORTAL LINK FT
You can access the FollowMyHealth Patient Portal offered by [facility] by registering at the following website: [email or web address]. By joining [employer]’s FollowMyHealth portal, you will also be able to view your health information using other applications (apps) compatible with our system.

## 2021-06-19 NOTE — ED ADULT NURSE REASSESSMENT NOTE - NS ED NURSE REASSESS COMMENT FT1
Comments: Pt dc by MD. IV DC as per hospital protocol. by MD   Pt expressed understanding of DC instructions.  Ambulated with steady gait to exit accompanied by

## 2021-06-19 NOTE — ED PROVIDER NOTE - NSFOLLOWUPINSTRUCTIONS_ED_ALL_ED_FT
Please follow up with your primary care provider for further concerns you may have regarding your general health. Attached you will find your results from today's visit. Continue taking your medications as prescribed and keep your upcoming medical appointments.    Please contact the ENT on Monday to follow up with the clinic as was discussed with you.     You have been prescribed steroids, take them as prescribed for the next five days.     Return to the Emerg Department if you develop difficulty breathing or your neck swelling worsens.

## 2021-06-21 LAB — TM INTERPRETATION: SIGNIFICANT CHANGE UP

## 2021-06-22 ENCOUNTER — APPOINTMENT (OUTPATIENT)
Dept: OTOLARYNGOLOGY | Facility: CLINIC | Age: 36
End: 2021-06-22
Payer: COMMERCIAL

## 2021-06-22 VITALS
HEART RATE: 102 BPM | DIASTOLIC BLOOD PRESSURE: 87 MMHG | SYSTOLIC BLOOD PRESSURE: 120 MMHG | WEIGHT: 189 LBS | BODY MASS INDEX: 34.78 KG/M2 | HEIGHT: 62 IN

## 2021-06-22 PROCEDURE — 99024 POSTOP FOLLOW-UP VISIT: CPT

## 2021-06-22 NOTE — PHYSICAL EXAM
[de-identified] : Incision C/D/I, postoperative inflammatory changes with slight TTP but no erythema or edema noted. [FreeTextEntry1] : Asymmetric L. tonsil - 3+.  Not particularly firm, no TTP.  No other concerning lesions in the OC/OPx on inspection or palpation.\par  [Normal] : no rashes

## 2021-06-22 NOTE — HISTORY OF PRESENT ILLNESS
[de-identified] : 36 year old female s/p excisional biopsy of left neck node, 06/16/2021. Reports went to urgent care 06/19/2021 due to pain and swelling at excision site, treated with morphine 4 mg and prescribed methylprednisolone that she has not started yet with mild relief. Reports mild dysphagia with solids and liquids. States able to move her neck. Denies bleeding. Denies odynophagia, dyspnea.

## 2021-06-22 NOTE — REASON FOR VISIT
[Subsequent Evaluation] : a subsequent evaluation for [FreeTextEntry2] : s/p excisional biopsy of left neck node, 06/16/2021.

## 2021-06-24 ENCOUNTER — EMERGENCY (EMERGENCY)
Facility: HOSPITAL | Age: 36
LOS: 1 days | Discharge: HOME | End: 2021-06-24
Attending: EMERGENCY MEDICINE | Admitting: EMERGENCY MEDICINE
Payer: COMMERCIAL

## 2021-06-24 VITALS
DIASTOLIC BLOOD PRESSURE: 89 MMHG | HEART RATE: 88 BPM | SYSTOLIC BLOOD PRESSURE: 137 MMHG | OXYGEN SATURATION: 96 % | RESPIRATION RATE: 16 BRPM | TEMPERATURE: 98 F

## 2021-06-24 VITALS
DIASTOLIC BLOOD PRESSURE: 88 MMHG | SYSTOLIC BLOOD PRESSURE: 127 MMHG | HEIGHT: 62 IN | RESPIRATION RATE: 18 BRPM | TEMPERATURE: 98 F | WEIGHT: 190.04 LBS | OXYGEN SATURATION: 99 % | HEART RATE: 126 BPM

## 2021-06-24 DIAGNOSIS — R10.11 RIGHT UPPER QUADRANT PAIN: ICD-10-CM

## 2021-06-24 DIAGNOSIS — R59.9 ENLARGED LYMPH NODES, UNSPECIFIED: ICD-10-CM

## 2021-06-24 DIAGNOSIS — Z90.49 ACQUIRED ABSENCE OF OTHER SPECIFIED PARTS OF DIGESTIVE TRACT: Chronic | ICD-10-CM

## 2021-06-24 DIAGNOSIS — R00.0 TACHYCARDIA, UNSPECIFIED: ICD-10-CM

## 2021-06-24 DIAGNOSIS — Z98.891 HISTORY OF UTERINE SCAR FROM PREVIOUS SURGERY: Chronic | ICD-10-CM

## 2021-06-24 DIAGNOSIS — D72.829 ELEVATED WHITE BLOOD CELL COUNT, UNSPECIFIED: ICD-10-CM

## 2021-06-24 LAB
ALBUMIN SERPL ELPH-MCNC: 3.5 G/DL — SIGNIFICANT CHANGE UP (ref 3.4–5)
ALP SERPL-CCNC: 118 U/L — SIGNIFICANT CHANGE UP (ref 40–120)
ALT FLD-CCNC: 56 U/L — HIGH (ref 12–42)
ANION GAP SERPL CALC-SCNC: 15 MMOL/L — SIGNIFICANT CHANGE UP (ref 9–16)
APPEARANCE UR: CLEAR — SIGNIFICANT CHANGE UP
AST SERPL-CCNC: 16 U/L — SIGNIFICANT CHANGE UP (ref 15–37)
BACTERIA # UR AUTO: PRESENT /HPF
BASOPHILS # BLD AUTO: 0 K/UL — SIGNIFICANT CHANGE UP (ref 0–0.2)
BASOPHILS NFR BLD AUTO: 0 % — SIGNIFICANT CHANGE UP (ref 0–2)
BILIRUB SERPL-MCNC: 0.3 MG/DL — SIGNIFICANT CHANGE UP (ref 0.2–1.2)
BILIRUB UR-MCNC: NEGATIVE — SIGNIFICANT CHANGE UP
BUN SERPL-MCNC: 17 MG/DL — SIGNIFICANT CHANGE UP (ref 7–23)
CALCIUM SERPL-MCNC: 9.2 MG/DL — SIGNIFICANT CHANGE UP (ref 8.5–10.5)
CHLORIDE SERPL-SCNC: 103 MMOL/L — SIGNIFICANT CHANGE UP (ref 96–108)
CO2 SERPL-SCNC: 21 MMOL/L — LOW (ref 22–31)
COLOR SPEC: YELLOW — SIGNIFICANT CHANGE UP
CREAT SERPL-MCNC: 0.86 MG/DL — SIGNIFICANT CHANGE UP (ref 0.5–1.3)
DIFF PNL FLD: ABNORMAL
EOSINOPHIL # BLD AUTO: 0.21 K/UL — SIGNIFICANT CHANGE UP (ref 0–0.5)
EOSINOPHIL NFR BLD AUTO: 1 % — SIGNIFICANT CHANGE UP (ref 0–6)
EPI CELLS # UR: ABNORMAL /HPF (ref 0–5)
GIANT PLATELETS BLD QL SMEAR: PRESENT — SIGNIFICANT CHANGE UP
GLUCOSE SERPL-MCNC: 263 MG/DL — HIGH (ref 70–99)
GLUCOSE UR QL: >=1000
HCG SERPL-ACNC: <1 MIU/ML — SIGNIFICANT CHANGE UP
HCT VFR BLD CALC: 39.8 % — SIGNIFICANT CHANGE UP (ref 34.5–45)
HGB BLD-MCNC: 13 G/DL — SIGNIFICANT CHANGE UP (ref 11.5–15.5)
HYPOSEGMENTATION: PRESENT — SIGNIFICANT CHANGE UP
KETONES UR-MCNC: NEGATIVE — SIGNIFICANT CHANGE UP
LACTATE SERPL-SCNC: 1.8 MMOL/L — SIGNIFICANT CHANGE UP (ref 0.4–2)
LEUKOCYTE ESTERASE UR-ACNC: NEGATIVE — SIGNIFICANT CHANGE UP
LIDOCAIN IGE QN: 112 U/L — SIGNIFICANT CHANGE UP (ref 73–393)
LYMPHOCYTES # BLD AUTO: 18 % — SIGNIFICANT CHANGE UP (ref 13–44)
LYMPHOCYTES # BLD AUTO: 3.85 K/UL — HIGH (ref 1–3.3)
MANUAL SMEAR VERIFICATION: SIGNIFICANT CHANGE UP
MCHC RBC-ENTMCNC: 29.3 PG — SIGNIFICANT CHANGE UP (ref 27–34)
MCHC RBC-ENTMCNC: 32.7 GM/DL — SIGNIFICANT CHANGE UP (ref 32–36)
MCV RBC AUTO: 89.8 FL — SIGNIFICANT CHANGE UP (ref 80–100)
METAMYELOCYTES # FLD: 2 % — HIGH (ref 0–0)
MONOCYTES # BLD AUTO: 1.07 K/UL — HIGH (ref 0–0.9)
MONOCYTES NFR BLD AUTO: 5 % — SIGNIFICANT CHANGE UP (ref 2–14)
MYELOCYTES NFR BLD: 2 % — HIGH (ref 0–0)
NEUTROPHILS # BLD AUTO: 15.42 K/UL — HIGH (ref 1.8–7.4)
NEUTROPHILS NFR BLD AUTO: 71 % — SIGNIFICANT CHANGE UP (ref 43–77)
NEUTS BAND # BLD: 1 % — SIGNIFICANT CHANGE UP (ref 0–8)
NITRITE UR-MCNC: NEGATIVE — SIGNIFICANT CHANGE UP
NRBC # BLD: 0 /100 — SIGNIFICANT CHANGE UP (ref 0–0)
NRBC # BLD: SIGNIFICANT CHANGE UP /100 WBCS (ref 0–0)
PH UR: 6 — SIGNIFICANT CHANGE UP (ref 5–8)
PLAT MORPH BLD: ABNORMAL
PLATELET # BLD AUTO: 507 K/UL — HIGH (ref 150–400)
PLATELET COUNT - ESTIMATE: ABNORMAL
POTASSIUM SERPL-MCNC: 4.3 MMOL/L — SIGNIFICANT CHANGE UP (ref 3.5–5.3)
POTASSIUM SERPL-SCNC: 4.3 MMOL/L — SIGNIFICANT CHANGE UP (ref 3.5–5.3)
PROT SERPL-MCNC: 7.9 G/DL — SIGNIFICANT CHANGE UP (ref 6.4–8.2)
PROT UR-MCNC: NEGATIVE MG/DL — SIGNIFICANT CHANGE UP
RBC # BLD: 4.43 M/UL — SIGNIFICANT CHANGE UP (ref 3.8–5.2)
RBC # FLD: 13.2 % — SIGNIFICANT CHANGE UP (ref 10.3–14.5)
RBC BLD AUTO: NORMAL — SIGNIFICANT CHANGE UP
RBC CASTS # UR COMP ASSIST: ABNORMAL /HPF
SODIUM SERPL-SCNC: 139 MMOL/L — SIGNIFICANT CHANGE UP (ref 132–145)
SP GR SPEC: 1.02 — SIGNIFICANT CHANGE UP (ref 1–1.03)
UROBILINOGEN FLD QL: 0.2 E.U./DL — SIGNIFICANT CHANGE UP
WBC # BLD: 21.41 K/UL — HIGH (ref 3.8–10.5)
WBC # FLD AUTO: 21.41 K/UL — HIGH (ref 3.8–10.5)
WBC UR QL: < 5 /HPF — SIGNIFICANT CHANGE UP

## 2021-06-24 PROCEDURE — 76705 ECHO EXAM OF ABDOMEN: CPT | Mod: 26

## 2021-06-24 PROCEDURE — 74177 CT ABD & PELVIS W/CONTRAST: CPT | Mod: 26

## 2021-06-24 PROCEDURE — 71260 CT THORAX DX C+: CPT | Mod: 26

## 2021-06-24 PROCEDURE — 99284 EMERGENCY DEPT VISIT MOD MDM: CPT

## 2021-06-24 RX ORDER — ACETAMINOPHEN 500 MG
650 TABLET ORAL ONCE
Refills: 0 | Status: COMPLETED | OUTPATIENT
Start: 2021-06-24 | End: 2021-06-24

## 2021-06-24 RX ORDER — KETOROLAC TROMETHAMINE 30 MG/ML
30 SYRINGE (ML) INJECTION ONCE
Refills: 0 | Status: DISCONTINUED | OUTPATIENT
Start: 2021-06-24 | End: 2021-06-24

## 2021-06-24 RX ORDER — SODIUM CHLORIDE 9 MG/ML
1000 INJECTION INTRAMUSCULAR; INTRAVENOUS; SUBCUTANEOUS ONCE
Refills: 0 | Status: COMPLETED | OUTPATIENT
Start: 2021-06-24 | End: 2021-06-24

## 2021-06-24 RX ORDER — PIPERACILLIN AND TAZOBACTAM 4; .5 G/20ML; G/20ML
3.38 INJECTION, POWDER, LYOPHILIZED, FOR SOLUTION INTRAVENOUS ONCE
Refills: 0 | Status: COMPLETED | OUTPATIENT
Start: 2021-06-24 | End: 2021-06-24

## 2021-06-24 RX ORDER — TRAMADOL HYDROCHLORIDE 50 MG/1
25 TABLET ORAL ONCE
Refills: 0 | Status: DISCONTINUED | OUTPATIENT
Start: 2021-06-24 | End: 2021-06-24

## 2021-06-24 RX ADMIN — PIPERACILLIN AND TAZOBACTAM 200 GRAM(S): 4; .5 INJECTION, POWDER, LYOPHILIZED, FOR SOLUTION INTRAVENOUS at 21:29

## 2021-06-24 RX ADMIN — SODIUM CHLORIDE 1000 MILLILITER(S): 9 INJECTION INTRAMUSCULAR; INTRAVENOUS; SUBCUTANEOUS at 19:32

## 2021-06-24 RX ADMIN — Medication 650 MILLIGRAM(S): at 22:53

## 2021-06-24 RX ADMIN — Medication 30 MILLIGRAM(S): at 19:32

## 2021-06-24 NOTE — ED ADULT TRIAGE NOTE - CHIEF COMPLAINT QUOTE
Patient to ED with complaint of RUQ pain beginning 4 days ago that has become worse in last 4 hours.  Worsening pain after eating.  Denies vomiting.  LMP 6/10

## 2021-06-24 NOTE — ED PROVIDER NOTE - CLINICAL SUMMARY MEDICAL DECISION MAKING FREE TEXT BOX
Patient with gradually worsening RUQ pain since Sat.  Exam suggests biliary disease, less likely renal colic, appy.  will start with labs, pain meds and sono.  Will add Ct if sono non-diagnostic Patient with gradually worsening RUQ pain since Sat.  Location of pain more typical of biliary origin, but patient has already had choley.  Will need to assess further with labs, imaging

## 2021-06-24 NOTE — ED PROVIDER NOTE - NS ED ROS FT
Constitutional:  No fever  HEENT:  No change in vision, no discharge, no congestion  Cardiac:  No chest pain, palpitations  Pulm:  No cough, no sob  GI:  RUQ abd pain  : No hematuria  Neuro:  No ha, no neck pain, no numbness, no weakness  MSK:  No joint pain or swelling  Skin:  No rash  Psychiatric: No suicidal or homicidal ideation, no hallucinations

## 2021-06-24 NOTE — ED PROVIDER NOTE - PROGRESS NOTE DETAILS
Patient still with pain.  Discuss results.  Had wbc 16,000 on saturday.  will add ct, pain meds. Accept sign out from Dr. Alarcon.  Pt with recent neck mass biopsy.  Now with worsening RUQ/R lower chest pain.  +tachycardia, +leukocytosis to 21.  Awaiting CT chest/abd/pelvis.  Will f/u results. Accept sign out from Dr. Alarcon.  Pt with recent neck mass biopsy.  Now with worsening RUQ/R lower chest pain.  +tachycardia, +leukocytosis to 21.  Awaiting CT chest/abd/pelvis.  Will f/u results.    Rounded on patient.  She is feeling better but has some residual pain.  I confirmed history.  Pt reports issues with leukocytosis for the past 3 years.  She has been working with an oncologist in [place], Dr. Victoriano Doyle.  W/U was not showing anything initially so he performed a PET scan that showed this 3 enlarged lymph nodes to the L neck.  She had these biopsied 8 days ago, results still pending.  On Saturday, 5 days ago, she went to an outside ED for pain and swelling to the biopsy area.  At that time her WBC was 16.  She was given morphine and started on a steroid taper.  This likely explains her elevating WBCs and pt reports they wax and wane but are usually elevated.  She relates this RUQ pain to getting the morphine.  She felt a small amount of pain after that ED visit and it has gotten worse since.  She reports a h/o H/Pylori but reports she was treated and re-tested and it has cleared.  Pt had a cholecystectomy in Jan of 2020. Kidney stone found on prelim read of CT.  Pt denies ho kidney stones but her mother has a history.  Denies any pain to L flank.  Denies any dysuria or hematuria.  Educated her regarding the stone and will have her f/u with urologist. Accept sign out from Dr. Alarcon.  Pt with recent neck mass biopsy.  Now with worsening RUQ/R lower chest pain.  +tachycardia, +leukocytosis to 21.  Awaiting CT chest/abd/pelvis.  Will f/u results.    Rounded on patient.  She is feeling better but has some residual pain.  I confirmed history.  Pt reports issues with leukocytosis for the past 3 years.  She has been working with an oncologist in [place], Dr. Victoriano Doyle ([phone] - called, no answer).  W/U was not showing anything initially so he performed a PET scan that showed this 3 enlarged lymph nodes to the L neck.  She had these biopsied 8 days ago, results still pending.  On Saturday, 5 days ago, she went to an outside ED for pain and swelling to the biopsy area.  At that time her WBC was 16.  She was given morphine and started on a steroid taper.  This likely explains her elevating WBCs and pt reports they wax and wane but are usually elevated.  She relates this RUQ pain to getting the morphine.  She felt a small amount of pain after that ED visit and it has gotten worse since.  She reports a h/o H/Pylori but reports she was treated and re-tested and it has cleared.  Pt had a cholecystectomy in Jan of 2020. Discussed all final results with pt.  Obtained further information that sxs worsened after starting PO prednisone.  Discussed possible gastritis from PO prednisone.  Instructed to stop steroids, start daily Omeprazole (pt has in house), and use Tylenol for breakthrough pain.  Will f/u with Dr. Doyle tomorrow am.  Emergent return precautions discussed.

## 2021-06-24 NOTE — ED PROVIDER NOTE - OBJECTIVE STATEMENT
patient with RUQ worsening since Saturday  She was in the ER at that time and had swelling in the area of a recent neck bx (lymph node-no results)  Pain started in the ER after she was given a dose of 4mg morphine  Was mild but has gradually worsened and is severe today  Worse with food  No fever, no nausea, vomiting, diarrhea, blood in stool or urine.  No prior episodes

## 2021-06-24 NOTE — ED ADULT NURSE REASSESSMENT NOTE - NS ED NURSE REASSESS COMMENT FT1
Pt with radiology at this time. Awaiting return.
Received Pt from previous RN awake and alert, breathing without issue on RA and NAD. IV site is patent. Family at the bedside. Awaiting urine sample.

## 2021-06-24 NOTE — ED PROVIDER NOTE - NSFOLLOWUPINSTRUCTIONS_ED_ALL_ED_FT
PLEASE FOLLOW-UP WITH DR. DIXON TOMORROW.  PLEASE LET HIM KNOW ABOUT YOUR TWO EMERGENCY VISITS.  BE SURE TO TAKE ALL PAPERWORK FROM TODAY'S VISIT TO YOUR FOLLOW UP APARTMENT.     PLEASE FOLLOW-UP WITH A UROLOGIST FOR THE KIDNEY STONE FOUND TO YOUR LEFT KIDNEY.  PLEASE SEE REFERRAL ABOVE OR FOLLOW UP WITH OUR REFERRAL COORDINATOR, MS. MARY STACK.  SHE CAN BE REACHED [phone].    PLEASE RETURN TO THE ER IMMEDIATELY OR CALL 911 FOR ANY HIGH FEVER, TROUBLE BREATHING, VOMITING, SEVERE PAIN, OR ANY OTHER CONCERNS. PLEASE FOLLOW-UP WITH DR. DIXON TOMORROW.  PLEASE LET HIM KNOW ABOUT YOUR TWO EMERGENCY VISITS.  BE SURE TO TAKE ALL PAPERWORK FROM TODAY'S VISIT TO YOUR FOLLOW UP APARTMENT.     PLEASE FOLLOW-UP WITH A UROLOGIST FOR THE KIDNEY STONE FOUND TO YOUR LEFT KIDNEY.  PLEASE SEE REFERRAL ABOVE OR FOLLOW UP WITH OUR REFERRAL COORDINATOR, MS. MARY STACK.  SHE CAN BE REACHED [phone].    PLEASE RETURN TO THE ER IMMEDIATELY OR CALL 911 FOR ANY HIGH FEVER, TROUBLE BREATHING, VOMITING, SEVERE PAIN, OR ANY OTHER CONCERNS.    PLEASE STOP THE STEROIDS AND START ON OMEPRAZOLE 20-40MG DAILY FOR 1 WEEK.

## 2021-06-24 NOTE — ED PROVIDER NOTE - PHYSICAL EXAMINATION
General:  Uncomfortable  HEENT:  No conjunctival injection, no ocular discharge, face flushed   Chest:  Non-tender, no crepitance  Lungs:  Clear to auscultation bilaterally   Heart:  s1s2 normal, no murmur  Abdomen:  soft, mod tender ruq with pos traylor's   :  Deferred  Rectal:  Deferred  Extremities: No edema, normal perfusion, no joint swelling or tenderness  Neuro:  Alert and oriented x 3, cn2-12 intact, full strength, sensory grossly intact, normal gait  Psychiatry:  Calm, cooperative, no expression of suicidal or homicidal ideation

## 2021-06-24 NOTE — ED PROVIDER NOTE - PATIENT PORTAL LINK FT
You can access the FollowMyHealth Patient Portal offered by [facility] by registering at the following website: [email or web address]. By joining [employer]’s FollowMyHealth portal, you will also be able to view your health information using other applications (apps) compatible with our system.

## 2021-06-24 NOTE — ED PROVIDER NOTE - CARE PROVIDER_API CALL
Priyank Higgins)  Urology  [address], [place] [address]  Phone: [phone]  Fax: [phone]  Follow Up Time: 4-6 Days

## 2021-06-25 LAB
HEMATOPATHOLOGY REPORT: SIGNIFICANT CHANGE UP
HEMATOPATHOLOGY REPORT: SIGNIFICANT CHANGE UP

## 2021-07-01 LAB — CHROM ANALY OVERALL INTERP SPEC-IMP: SIGNIFICANT CHANGE UP

## 2021-07-13 ENCOUNTER — APPOINTMENT (OUTPATIENT)
Dept: OTOLARYNGOLOGY | Facility: CLINIC | Age: 36
End: 2021-07-13
Payer: COMMERCIAL

## 2021-07-13 VITALS
SYSTOLIC BLOOD PRESSURE: 130 MMHG | WEIGHT: 198 LBS | HEART RATE: 85 BPM | DIASTOLIC BLOOD PRESSURE: 85 MMHG | HEIGHT: 62 IN | BODY MASS INDEX: 36.44 KG/M2

## 2021-07-13 PROCEDURE — 99214 OFFICE O/P EST MOD 30 MIN: CPT

## 2021-07-13 PROCEDURE — 99072 ADDL SUPL MATRL&STAF TM PHE: CPT

## 2021-07-13 RX ORDER — CHOLESTYRAMINE 4 G/9G
4 POWDER, FOR SUSPENSION ORAL
Qty: 270 | Refills: 0 | Status: COMPLETED | COMMUNITY
Start: 2021-04-27

## 2021-07-13 RX ORDER — METHYLPREDNISOLONE 4 MG/1
4 TABLET ORAL
Qty: 21 | Refills: 0 | Status: COMPLETED | COMMUNITY
Start: 2021-06-19

## 2021-07-13 RX ORDER — IBUPROFEN 800 MG/1
800 TABLET, FILM COATED ORAL
Qty: 28 | Refills: 0 | Status: COMPLETED | COMMUNITY
Start: 2021-05-19

## 2021-07-13 RX ORDER — CHLORHEXIDINE GLUCONATE, 0.12% ORAL RINSE 1.2 MG/ML
0.12 SOLUTION DENTAL
Qty: 473 | Refills: 0 | Status: COMPLETED | COMMUNITY
Start: 2021-05-19

## 2021-07-13 NOTE — REASON FOR VISIT
[Subsequent Evaluation] : a subsequent evaluation for [FreeTextEntry2] : follow up s/p excisional biopsy of left neck node, 06/16/2021. \par

## 2021-07-13 NOTE — PHYSICAL EXAM
[de-identified] : Incision healing well, no LAD. [FreeTextEntry1] : Asymmetric L. tonsil - 3+.  Not particularly firm, no TTP.  No other concerning lesions in the OC/OPx on inspection or palpation.\par  [Normal] : no rashes

## 2021-07-13 NOTE — HISTORY OF PRESENT ILLNESS
[de-identified] : 36 year old female follow up s/p excisional biopsy of left neck node, 06/16/2021. Reports mild swelling but pain has subsided. Denies dysphagia, odynophagia, dyspnea. States able to move her neck. Denies bleeding.

## 2021-07-13 NOTE — CONSULT LETTER
[Dear  ___] : Dear ~IRON, [Courtesy Letter:] : I had the pleasure of seeing your patient, [unfilled], in my office today. [Please see my note below.] : Please see my note below. [Consult Closing:] : Thank you very much for allowing me to participate in the care of this patient.  If you have any questions, please do not hesitate to contact me. [Sincerely,] : Sincerely, [FreeTextEntry2] : Dr. Victoriano Doyle\[address]\[place] [id number] [FreeTextEntry3] : Dev

## 2021-09-21 ENCOUNTER — APPOINTMENT (OUTPATIENT)
Dept: OTOLARYNGOLOGY | Facility: CLINIC | Age: 36
End: 2021-09-21
Payer: COMMERCIAL

## 2021-09-21 DIAGNOSIS — R59.1 GENERALIZED ENLARGED LYMPH NODES: ICD-10-CM

## 2021-09-21 PROCEDURE — 99214 OFFICE O/P EST MOD 30 MIN: CPT | Mod: 25

## 2021-09-21 PROCEDURE — 31575 DIAGNOSTIC LARYNGOSCOPY: CPT

## 2021-09-21 NOTE — PHYSICAL EXAM
[de-identified] : Small, tender LAD along the posterior neck on the left and suboccipital on the right.  Incision is healing well.   [Laryngoscopy Performed] : laryngoscopy was performed, see procedure section for findings [FreeTextEntry1] : Asymmetric L. tonsil - 3+ - stable.  Not particularly firm, no TTP.  No other concerning lesions in the OC/OPx on inspection or palpation.\par  [Normal] : no rashes

## 2021-09-21 NOTE — PROCEDURE
[Globus] : globus [None] : none [Flexible Endoscope] : examined with the flexible endoscope [Serial Number: ___] : Serial Number: [unfilled] [de-identified] : No lesions in the NPx, OPx, HPx or larynx.  VC are mobile, airway patent.  Patient with significant changes of LPRD on exam with interarytenoid pachyderma and edema.\par

## 2021-09-21 NOTE — HISTORY OF PRESENT ILLNESS
[de-identified] : 36 year old female follow up s/p excisional biopsy of left neck node, 06/16/2021, healing well, no c.o. , f/u on asym of the tonsil. pt has no sore throat or dysphagia, odynophagia, dyspnea. States able to move her neck. Denies bleeding.  She has persistent globus sensation.  She hasn't been following LPRD recommendations.  She is somewhat concerned about recent blood work showing slightly elevated WBC and platelets.

## 2021-12-14 NOTE — ASU PATIENT PROFILE, ADULT - ANESTHESIA, PREVIOUS REACTION, PROFILE
pt denies family hx of complications with anesthesia/nausea/vomiting hard copy, drawn during this pregnancy

## 2021-12-14 NOTE — ED ADULT NURSE NOTE - ABDOMEN
What Type Of Note Output Would You Prefer (Optional)?: Standard Output
Is The Patient Presenting As Previously Scheduled?: Yes
How Severe Is Your Rash?: moderate
Is This A New Presentation, Or A Follow-Up?: Rash
soft

## 2022-01-22 ENCOUNTER — EMERGENCY (EMERGENCY)
Facility: HOSPITAL | Age: 37
LOS: 1 days | Discharge: HOME | End: 2022-01-22
Attending: EMERGENCY MEDICINE | Admitting: EMERGENCY MEDICINE
Payer: COMMERCIAL

## 2022-01-22 VITALS
OXYGEN SATURATION: 98 % | SYSTOLIC BLOOD PRESSURE: 129 MMHG | TEMPERATURE: 98 F | DIASTOLIC BLOOD PRESSURE: 89 MMHG | WEIGHT: 190.92 LBS | HEART RATE: 110 BPM | RESPIRATION RATE: 20 BRPM | HEIGHT: 62 IN

## 2022-01-22 DIAGNOSIS — R10.11 RIGHT UPPER QUADRANT PAIN: ICD-10-CM

## 2022-01-22 DIAGNOSIS — Z90.49 ACQUIRED ABSENCE OF OTHER SPECIFIED PARTS OF DIGESTIVE TRACT: Chronic | ICD-10-CM

## 2022-01-22 DIAGNOSIS — R11.0 NAUSEA: ICD-10-CM

## 2022-01-22 DIAGNOSIS — Z98.891 HISTORY OF UTERINE SCAR FROM PREVIOUS SURGERY: Chronic | ICD-10-CM

## 2022-01-22 DIAGNOSIS — I10 ESSENTIAL (PRIMARY) HYPERTENSION: ICD-10-CM

## 2022-01-22 PROCEDURE — 93010 ELECTROCARDIOGRAM REPORT: CPT | Mod: NC

## 2022-01-22 PROCEDURE — 99285 EMERGENCY DEPT VISIT HI MDM: CPT | Mod: 25

## 2022-01-22 RX ORDER — SODIUM CHLORIDE 9 MG/ML
1000 INJECTION INTRAMUSCULAR; INTRAVENOUS; SUBCUTANEOUS ONCE
Refills: 0 | Status: COMPLETED | OUTPATIENT
Start: 2022-01-22 | End: 2022-01-22

## 2022-01-22 RX ORDER — FAMOTIDINE 10 MG/ML
20 INJECTION INTRAVENOUS ONCE
Refills: 0 | Status: COMPLETED | OUTPATIENT
Start: 2022-01-22 | End: 2022-01-22

## 2022-01-22 RX ORDER — ONDANSETRON 8 MG/1
4 TABLET, FILM COATED ORAL ONCE
Refills: 0 | Status: COMPLETED | OUTPATIENT
Start: 2022-01-22 | End: 2022-01-22

## 2022-01-22 RX ORDER — IOHEXOL 300 MG/ML
30 INJECTION, SOLUTION INTRAVENOUS ONCE
Refills: 0 | Status: COMPLETED | OUTPATIENT
Start: 2022-01-22 | End: 2022-01-23

## 2022-01-22 RX ORDER — KETOROLAC TROMETHAMINE 30 MG/ML
15 SYRINGE (ML) INJECTION ONCE
Refills: 0 | Status: DISCONTINUED | OUTPATIENT
Start: 2022-01-22 | End: 2022-01-22

## 2022-01-22 RX ORDER — HYDROMORPHONE HYDROCHLORIDE 2 MG/ML
1 INJECTION INTRAMUSCULAR; INTRAVENOUS; SUBCUTANEOUS ONCE
Refills: 0 | Status: DISCONTINUED | OUTPATIENT
Start: 2022-01-22 | End: 2022-01-22

## 2022-01-22 NOTE — ED PROVIDER NOTE - OBJECTIVE STATEMENT
38 yo female with right flank/Upper abd pain x 1 day. Pt just finished course of abx for H. pylori today. Denies dysuria/fever/vomiting. + nausea.   past sx hx: zen. 36 yo female with right flank/Upper abd pain x 1 day. Pt just finished course of abx for H. pylori today. Denies dysuria/fever/vomiting. + nausea.   past sx hx: zen.  LMP began 1/22/2022.

## 2022-01-22 NOTE — ED PROVIDER NOTE - PROGRESS NOTE DETAILS
Pt's pain fully resolved, No emergent pathology identified on ED medical screening examination. Pt is stable for discharge and outpatient follow-up.

## 2022-01-22 NOTE — ED PROVIDER NOTE - PATIENT PORTAL LINK FT
You can access the FollowMyHealth Patient Portal offered by [facility] by registering at the following website: [email or web address]. By joining [employer]’s FollowMyHealth portal, you will also be able to view your health information using other applications (apps) compatible with our system.

## 2022-01-22 NOTE — ED ADULT TRIAGE NOTE - CHIEF COMPLAINT QUOTE
Pt walked into ER c/o abdominal pain radiating to lower back for the past 3 days with painful urination worsening tonight, Pt reports x1 episode of vomiting yesterday. Pt also states she just finished ABX (14 days) for UTI. PMH HTN.

## 2022-01-23 VITALS
HEART RATE: 100 BPM | RESPIRATION RATE: 18 BRPM | OXYGEN SATURATION: 98 % | SYSTOLIC BLOOD PRESSURE: 125 MMHG | DIASTOLIC BLOOD PRESSURE: 84 MMHG

## 2022-01-23 LAB
ALBUMIN SERPL ELPH-MCNC: 3.6 G/DL — SIGNIFICANT CHANGE UP (ref 3.4–5)
ALP SERPL-CCNC: 109 U/L — SIGNIFICANT CHANGE UP (ref 40–120)
ALT FLD-CCNC: 45 U/L — HIGH (ref 12–42)
AMYLASE P1 CFR SERPL: 32 U/L — SIGNIFICANT CHANGE UP (ref 25–115)
ANION GAP SERPL CALC-SCNC: 11 MMOL/L — SIGNIFICANT CHANGE UP (ref 9–16)
APPEARANCE UR: ABNORMAL
AST SERPL-CCNC: 30 U/L — SIGNIFICANT CHANGE UP (ref 15–37)
BACTERIA # UR AUTO: PRESENT /HPF
BILIRUB SERPL-MCNC: 0.3 MG/DL — SIGNIFICANT CHANGE UP (ref 0.2–1.2)
BILIRUB UR-MCNC: NEGATIVE — SIGNIFICANT CHANGE UP
BUN SERPL-MCNC: 9 MG/DL — SIGNIFICANT CHANGE UP (ref 7–23)
CALCIUM SERPL-MCNC: 8.5 MG/DL — SIGNIFICANT CHANGE UP (ref 8.5–10.5)
CHLORIDE SERPL-SCNC: 103 MMOL/L — SIGNIFICANT CHANGE UP (ref 96–108)
CO2 SERPL-SCNC: 28 MMOL/L — SIGNIFICANT CHANGE UP (ref 22–31)
COD CRY URNS QL: ABNORMAL /HPF
COLOR SPEC: YELLOW — SIGNIFICANT CHANGE UP
COMMENT - URINE: SIGNIFICANT CHANGE UP
CREAT SERPL-MCNC: 0.84 MG/DL — SIGNIFICANT CHANGE UP (ref 0.5–1.3)
DIFF PNL FLD: ABNORMAL
EPI CELLS # UR: SIGNIFICANT CHANGE UP /HPF (ref 0–5)
GLUCOSE SERPL-MCNC: 126 MG/DL — HIGH (ref 70–99)
GLUCOSE UR QL: NEGATIVE — SIGNIFICANT CHANGE UP
HCG SERPL-ACNC: <1 MIU/ML — SIGNIFICANT CHANGE UP
HCG UR QL: NEGATIVE — SIGNIFICANT CHANGE UP
HCT VFR BLD CALC: 40.2 % — SIGNIFICANT CHANGE UP (ref 34.5–45)
HGB BLD-MCNC: 13.5 G/DL — SIGNIFICANT CHANGE UP (ref 11.5–15.5)
KETONES UR-MCNC: ABNORMAL MG/DL
LEUKOCYTE ESTERASE UR-ACNC: NEGATIVE — SIGNIFICANT CHANGE UP
LIDOCAIN IGE QN: 99 U/L — SIGNIFICANT CHANGE UP (ref 73–393)
MCHC RBC-ENTMCNC: 29.9 PG — SIGNIFICANT CHANGE UP (ref 27–34)
MCHC RBC-ENTMCNC: 33.6 GM/DL — SIGNIFICANT CHANGE UP (ref 32–36)
MCV RBC AUTO: 89.1 FL — SIGNIFICANT CHANGE UP (ref 80–100)
NITRITE UR-MCNC: NEGATIVE — SIGNIFICANT CHANGE UP
NRBC # BLD: 0 /100 WBCS — SIGNIFICANT CHANGE UP (ref 0–0)
PH UR: 6 — SIGNIFICANT CHANGE UP (ref 5–8)
PLATELET # BLD AUTO: 357 K/UL — SIGNIFICANT CHANGE UP (ref 150–400)
POTASSIUM SERPL-MCNC: 3.6 MMOL/L — SIGNIFICANT CHANGE UP (ref 3.5–5.3)
POTASSIUM SERPL-SCNC: 3.6 MMOL/L — SIGNIFICANT CHANGE UP (ref 3.5–5.3)
PROT SERPL-MCNC: 7.4 G/DL — SIGNIFICANT CHANGE UP (ref 6.4–8.2)
PROT UR-MCNC: NEGATIVE MG/DL — SIGNIFICANT CHANGE UP
RBC # BLD: 4.51 M/UL — SIGNIFICANT CHANGE UP (ref 3.8–5.2)
RBC # FLD: 12.9 % — SIGNIFICANT CHANGE UP (ref 10.3–14.5)
RBC CASTS # UR COMP ASSIST: ABNORMAL /HPF
SODIUM SERPL-SCNC: 142 MMOL/L — SIGNIFICANT CHANGE UP (ref 132–145)
SP GR SPEC: 1.02 — SIGNIFICANT CHANGE UP (ref 1–1.03)
UROBILINOGEN FLD QL: 0.2 E.U./DL — SIGNIFICANT CHANGE UP
WBC # BLD: 6.87 K/UL — SIGNIFICANT CHANGE UP (ref 3.8–10.5)
WBC # FLD AUTO: 6.87 K/UL — SIGNIFICANT CHANGE UP (ref 3.8–10.5)
WBC UR QL: < 5 /HPF — SIGNIFICANT CHANGE UP

## 2022-01-23 PROCEDURE — 74177 CT ABD & PELVIS W/CONTRAST: CPT | Mod: 26

## 2022-01-23 RX ORDER — OXYCODONE HYDROCHLORIDE 5 MG/1
1 TABLET ORAL
Qty: 18 | Refills: 0
Start: 2022-01-23 | End: 2022-01-25

## 2022-01-23 RX ADMIN — SODIUM CHLORIDE 1000 MILLILITER(S): 9 INJECTION INTRAMUSCULAR; INTRAVENOUS; SUBCUTANEOUS at 00:11

## 2022-01-23 RX ADMIN — Medication 15 MILLIGRAM(S): at 00:10

## 2022-01-23 RX ADMIN — HYDROMORPHONE HYDROCHLORIDE 1 MILLIGRAM(S): 2 INJECTION INTRAMUSCULAR; INTRAVENOUS; SUBCUTANEOUS at 00:10

## 2022-01-23 RX ADMIN — ONDANSETRON 4 MILLIGRAM(S): 8 TABLET, FILM COATED ORAL at 00:10

## 2022-01-23 RX ADMIN — FAMOTIDINE 20 MILLIGRAM(S): 10 INJECTION INTRAVENOUS at 00:10

## 2022-01-23 RX ADMIN — IOHEXOL 30 MILLILITER(S): 300 INJECTION, SOLUTION INTRAVENOUS at 00:11

## 2022-01-23 NOTE — ED ADULT NURSE NOTE - CAS ELECT INFOMATION PROVIDED
Caller is calling in regards to the patient's medication, semaglutide,0.25 or 0.5 mg/DOSE, (Ozempic, 0.25 or 0.5 MG/DOSE,) (1.34 mg/ml) 0.25 or 0.5 MG/DOSE.Caller stated they have a questions on the directions. Caller stated they would like to know the dosage of the medication is going to be increased after a few months to the therapeutic dosage. Caller would like a call back.    Patient Name: Efrain Traore  Caller Name: Jennifer  Name of Facility: [facility]  Callback Number: [phone]  Best Availability: Anytime  Additional Info: Reference number: [id number]  Did you confirm the message with the caller?: yes    Thank you,  Jazmine Kapoor V     DC instructions

## 2022-01-23 NOTE — ED ADULT NURSE NOTE - CAS DISCH CONDITION
Patient is a 74y old  Female who presents with a chief complaint of cabg eval (2021 17:56)    Coverage for Dr. Michel Strickland   SUBJECTIVE / OVERNIGHT EVENTS: Comfortable Son at bedside.  Review of Systems  chest pain no  palpitations no  sob no  nausea no  headache no    MEDICATIONS  (STANDING):  aspirin enteric coated 81 milliGRAM(s) Oral daily  buPROPion SR (12-Hour) 150 milliGRAM(s) Oral two times a day  cephalexin 250 milliGRAM(s) Oral daily  cilostazol 100 milliGRAM(s) Oral two times a day  fluticasone furoate/umeclidinium/vilanterol 100-62.5-25 MICROgram(s) Inhaler 1 Puff(s) Inhalation daily  gabapentin 300 milliGRAM(s) Oral two times a day  heparin   Injectable 5000 Unit(s) SubCutaneous every 12 hours  nicotine - 21 mG/24Hr(s) Patch 1 patch Transdermal daily  pantoprazole    Tablet 40 milliGRAM(s) Oral before breakfast  sodium chloride 1 Gram(s) Oral two times a day    MEDICATIONS  (PRN):  acetaminophen   Tablet .. 650 milliGRAM(s) Oral every 6 hours PRN Mild Pain (1 - 3), Moderate Pain (4 - 6)  ALBUTerol    90 MICROgram(s) HFA Inhaler 2 Puff(s) Inhalation every 6 hours PRN Shortness of Breath      Vital Signs Last 24 Hrs  T(C): 36.4 (2021 11:31), Max: 36.9 (2021 00:11)  T(F): 97.5 (2021 11:31), Max: 98.4 (2021 00:11)  HR: 77 (2021 11:31) (74 - 86)  BP: 122/65 (2021 11:31) (112/58 - 129/55)  BP(mean): --  RR: 18 (2021 11:31) (16 - 18)  SpO2: 94% (2021 11:31) (93% - 95%)    PHYSICAL EXAM:  GENERAL: NAD, well-developed  HEAD:  Atraumatic, Normocephalic  EYES: EOMI, PERRLA, conjunctiva and sclera clear  NECK: Supple, No JVD  CHEST/LUNG: Clear to auscultation bilaterally; No wheeze  HEART: Regular rate and rhythm; No murmurs, rubs, or gallops  ABDOMEN: Soft, Nontender, Nondistended; Bowel sounds present  EXTREMITIES:  2+ Peripheral Pulses, No clubbing, cyanosis, or edema  PSYCH: AAOx3  NEUROLOGY: non-focal  SKIN: No rashes or lesions    LABS:                        9.6    8.29  )-----------( 156      ( 2021 06:04 )             29.0     07-    135  |  106  |  19  ----------------------------<  88  4.1   |  19<L>  |  1.34<H>    Ca    8.9      2021 06:02            Urinalysis Basic - ( 2021 05:45 )    Color: Colorless / Appearance: Clear / S.005 / pH: x  Gluc: x / Ketone: Negative  / Bili: Negative / Urobili: Negative   Blood: x / Protein: Negative / Nitrite: Negative   Leuk Esterase: Negative / RBC: x / WBC x   Sq Epi: x / Non Sq Epi: x / Bacteria: x      < from: Transthoracic Echocardiogram (21 @ 08:00) >  Conclusions:  1. Calcified trileaflet aortic valve with normal opening.  2. Hyperdynamic left ventricular systolic function.  3. Normal right ventricular size and function.  4. Thickened pericardium. Pericardial fat pad noted. Small  pericardial effusion posterior to  the LV and RV.  There is  1.1cm organized collection anterior to the RV. No tamponade  seen.    < end of copied text >      RADIOLOGY & ADDITIONAL TESTS:    Imaging Personally Reviewed:    Consultant(s) Notes Reviewed:      Care Discussed with Consultants/Other Providers:   Improved

## 2022-03-22 ENCOUNTER — APPOINTMENT (OUTPATIENT)
Dept: OTOLARYNGOLOGY | Facility: CLINIC | Age: 37
End: 2022-03-22

## 2022-05-03 ENCOUNTER — APPOINTMENT (OUTPATIENT)
Dept: OTOLARYNGOLOGY | Facility: CLINIC | Age: 37
End: 2022-05-03

## 2022-08-10 ENCOUNTER — TRANSCRIPTION ENCOUNTER (OUTPATIENT)
Age: 37
End: 2022-08-10

## 2022-08-11 ENCOUNTER — TRANSCRIPTION ENCOUNTER (OUTPATIENT)
Age: 37
End: 2022-08-11

## 2022-08-16 ENCOUNTER — APPOINTMENT (OUTPATIENT)
Dept: OTOLARYNGOLOGY | Facility: CLINIC | Age: 37
End: 2022-08-16

## 2022-08-16 VITALS
SYSTOLIC BLOOD PRESSURE: 128 MMHG | HEART RATE: 80 BPM | HEIGHT: 62 IN | WEIGHT: 198.85 LBS | DIASTOLIC BLOOD PRESSURE: 87 MMHG | TEMPERATURE: 97.4 F | BODY MASS INDEX: 36.59 KG/M2

## 2022-08-16 PROCEDURE — 99214 OFFICE O/P EST MOD 30 MIN: CPT | Mod: 25

## 2022-08-16 PROCEDURE — 31231 NASAL ENDOSCOPY DX: CPT

## 2022-08-16 PROCEDURE — 76536 US EXAM OF HEAD AND NECK: CPT

## 2022-08-16 RX ORDER — CHOLECALCIFEROL (VITAMIN D3) 1250 MCG
1.25 MG CAPSULE ORAL
Qty: 4 | Refills: 0 | Status: ACTIVE | COMMUNITY
Start: 2021-10-19

## 2022-08-16 RX ORDER — AZELASTINE HYDROCHLORIDE 137 UG/1
0.1 SPRAY, METERED NASAL
Qty: 30 | Refills: 0 | Status: ACTIVE | COMMUNITY
Start: 2022-06-02

## 2022-08-16 RX ORDER — PEN NEEDLE, DIABETIC 32 GX 1/4"
32G X 6 MM NEEDLE, DISPOSABLE MISCELLANEOUS
Qty: 100 | Refills: 0 | Status: ACTIVE | COMMUNITY
Start: 2022-07-29

## 2022-08-16 RX ORDER — CETIRIZINE HYDROCHLORIDE 10 MG/1
10 TABLET, COATED ORAL
Qty: 15 | Refills: 0 | Status: DISCONTINUED | COMMUNITY
Start: 2022-06-02

## 2022-08-16 RX ORDER — PROMETHAZINE HYDROCHLORIDE AND DEXTROMETHORPHAN HYDROBROMIDE ORAL SOLUTION 15; 6.25 MG/5ML; MG/5ML
6.25-15 SOLUTION ORAL
Qty: 300 | Refills: 0 | Status: DISCONTINUED | COMMUNITY
Start: 2022-06-02

## 2022-08-16 RX ORDER — OMEPRAZOLE 40 MG/1
40 CAPSULE, DELAYED RELEASE ORAL
Qty: 30 | Refills: 5 | Status: DISCONTINUED | COMMUNITY
Start: 2021-04-06 | End: 2022-08-16

## 2022-08-16 RX ORDER — AZITHROMYCIN 250 MG/1
250 TABLET, FILM COATED ORAL
Qty: 6 | Refills: 0 | Status: DISCONTINUED | COMMUNITY
Start: 2022-06-02

## 2022-08-16 RX ORDER — AMLODIPINE BESYLATE 5 MG/1
5 TABLET ORAL
Refills: 0 | Status: DISCONTINUED | COMMUNITY
End: 2022-08-16

## 2022-08-16 RX ORDER — AMOXICILLIN 875 MG/1
875 TABLET, FILM COATED ORAL
Qty: 20 | Refills: 0 | Status: DISCONTINUED | COMMUNITY
Start: 2022-08-10

## 2022-08-16 RX ORDER — IRON/IRON ASP GLY/FA/MV-MIN 38 125-25-1MG
TABLET ORAL
Refills: 0 | Status: DISCONTINUED | COMMUNITY
End: 2022-08-16

## 2022-08-16 RX ORDER — PREDNISONE 20 MG/1
20 TABLET ORAL
Qty: 10 | Refills: 0 | Status: DISCONTINUED | COMMUNITY
Start: 2022-06-02

## 2022-08-16 NOTE — PROCEDURE
[Globus] : globus [Anterior rhinoscopy insufficient to account for symptoms] : anterior rhinoscopy insufficient to account for symptoms [None] : none [Flexible Endoscope] : examined with the flexible endoscope [FreeTextEntry6] : No masses or lesions in the NC or NPx.  Patient with moderate but clear secretions from [facility] bilaterally without any evidence of purulence of polypoid changes.   [Serial Number: ___] : Serial Number: [unfilled] [de-identified] : No lesions in the NPx, OPx, HPx or larynx.  VC are mobile, airway patent.  Patient with significant changes of LPRD on exam with interarytenoid pachyderma and edema.\par

## 2022-08-16 NOTE — HISTORY OF PRESENT ILLNESS
[de-identified] : 37 year old woman, annual follow up for asymmetrical tonsils, Left > Right\par History of LPRD and lymphadenopathy\par s/p excisional biopsy of left neck node, 06/16/2021\par Following Dr. Doyle\par Associated globus sensation, intermittent sore throat and chronic nasal congestion with watery eyes for the past year - green sputum produced when coughing\par Denies dysphagia, odynophagia, SOB, hemoptysis, recent fevers, throat/oral infections

## 2022-08-16 NOTE — DATA REVIEWED
[de-identified] : US today without any pathologic LAD in both necks.  The dominant node in right level II measures 1.92 x 1.42 x 1.77 cm.  The dominant node in left level II measures 1.89 x 1.32 x 1.85 cm.  Both have fatty dominick.

## 2022-08-16 NOTE — PHYSICAL EXAM
[Laryngoscopy Performed] : laryngoscopy was performed, see procedure section for findings [Normal] : no rashes [de-identified] : Small, tender LAD along the posterior neck on the left and suboccipital on the right.  Incision is healing well.   [Nasal Endoscopy Performed] : nasal endoscopy was performed, see procedure section for findings [de-identified] : T [FreeTextEntry1] : Asymmetric L. tonsil - 3+ - stable.  Not particularly firm, no TTP.  No other concerning lesions in the OC/OPx on inspection or palpation.\par

## 2022-11-01 NOTE — ED PROVIDER NOTE - AXIS
Impression: Puckering of macula, bilateral: H35.373. Plan: Discussed diagnosis in detail with patient. No treatment is required at this time. Patient was instructed to report any type of visual field changes or visual changes immediately. Normal

## 2022-11-08 NOTE — H&P PST ADULT - NSICDXPROBLEM_GEN_ALL_CORE_FT
none
PROBLEM DIAGNOSES  Problem: Hypertension  Assessment and Plan: Patient instructed to take amlodipine with a sip of water the morning of surgery     Problem: Calculus of gallbladder without cholecystitis without obstruction  Assessment and Plan: Preoperative instructions given to patient with understanding verbalized for scheduled laparoscopic cholecystectomy, possible open on 1/29/2

## 2022-11-15 ENCOUNTER — APPOINTMENT (OUTPATIENT)
Dept: OTOLARYNGOLOGY | Facility: CLINIC | Age: 37
End: 2022-11-15

## 2022-11-15 VITALS
DIASTOLIC BLOOD PRESSURE: 88 MMHG | BODY MASS INDEX: 34.78 KG/M2 | SYSTOLIC BLOOD PRESSURE: 134 MMHG | WEIGHT: 189 LBS | HEART RATE: 88 BPM | HEIGHT: 62 IN

## 2022-11-15 PROCEDURE — 31231 NASAL ENDOSCOPY DX: CPT

## 2022-11-15 PROCEDURE — 99214 OFFICE O/P EST MOD 30 MIN: CPT | Mod: 25

## 2022-11-15 NOTE — REASON FOR VISIT
[Subsequent Evaluation] : a subsequent evaluation for [FreeTextEntry2] : asymmetrical tonsils and reflux

## 2022-11-15 NOTE — PHYSICAL EXAM
[Nasal Endoscopy Performed] : nasal endoscopy was performed, see procedure section for findings [Laryngoscopy Performed] : laryngoscopy was performed, see procedure section for findings [Normal] : no rashes [de-identified] : Small, tender LAD along the posterior neck on the left and suboccipital on the right.  Incision is healing well.   [FreeTextEntry1] : Asymmetric L. tonsil - 3+ - stable.  Not particularly firm, no TTP.  No other concerning lesions in the OC/OPx on inspection or palpation.\par

## 2022-11-15 NOTE — HISTORY OF PRESENT ILLNESS
[de-identified] : 37 year old woman with hx of asymmetrical tonsils, Left > Right, LPRD and lymphadenopathy, s/p excisional biopsy of left neck node, 06/16/2021 and f/u with Dr. Doyle.  Pt is here today to f/u on asymmetrical tonsil, LPRD and lymphadenopathy, no change or new c.o. pt needs refill for nasal spray, c.o nasal congestion, itching and burning sensation of eyes year round. \par \par \par Denies dysphagia, odynophagia, SOB, hemoptysis, recent fevers, throat/oral infections

## 2022-11-15 NOTE — PROCEDURE
[Anterior rhinoscopy insufficient to account for symptoms] : anterior rhinoscopy insufficient to account for symptoms [Globus] : globus [None] : none [Flexible Endoscope] : examined with the flexible endoscope [FreeTextEntry6] : No masses or lesions in the NC or NPx. Patient with moderate but clear secretions from [facility] bilaterally without any evidence of purulence of polypoid changes.  [Serial Number: ___] : Serial Number: [unfilled] [de-identified] : No lesions in the NPx, OPx, HPx or larynx.  VC are mobile, airway patent.  Patient with significant changes of LPRD on exam with interarytenoid pachyderma and edema.\par

## 2023-03-08 NOTE — H&P PST ADULT - ENERGY EXPENDITURE (METS)
>4 General Sunscreen Counseling: I recommended a broad spectrum sunscreen with a SPF of 30 or higher.  I explained that SPF 30 sunscreens block approximately 97 percent of the sun's harmful rays.  Sunscreens should be applied at least 15 minutes prior to expected sun exposure and then every 2 hours after that as long as sun exposure continues. If swimming or exercising sunscreen should be reapplied every 45 minutes to an hour after getting wet or sweating.  One ounce, or the equivalent of a shot glass full of sunscreen, is adequate to protect the skin not covered by a bathing suit. I also recommended a lip balm with a sunscreen as well. Sun protective clothing can be used in lieu of sunscreen but must be worn the entire time you are exposed to the sun's rays. Detail Level: Generalized

## 2023-03-14 ENCOUNTER — APPOINTMENT (OUTPATIENT)
Dept: OTOLARYNGOLOGY | Facility: CLINIC | Age: 38
End: 2023-03-14

## 2023-05-17 NOTE — ED PROVIDER NOTE - NSTIMEPROVIDERCAREINITIATE_GEN_ER
Detail Level: Detailed
Plan: Scar at site of recent biopsy and C&D at [place]\\nPathology showed nodular and morpheaform basal cell carcinoma and Mohs was advised \\nShe has not yet been contacted to schedule her Mohs \\nI concur with Mohs for this diagnosis and advise it be treated as soon as possible given aggressive pathology in high risk location \\nFollow up as needed
22-Jan-2022 23:43

## 2023-05-30 ENCOUNTER — APPOINTMENT (OUTPATIENT)
Dept: OTOLARYNGOLOGY | Facility: CLINIC | Age: 38
End: 2023-05-30
Payer: COMMERCIAL

## 2023-05-30 VITALS
HEART RATE: 69 BPM | SYSTOLIC BLOOD PRESSURE: 123 MMHG | OXYGEN SATURATION: 99 % | BODY MASS INDEX: 31.28 KG/M2 | DIASTOLIC BLOOD PRESSURE: 83 MMHG | WEIGHT: 170 LBS | HEIGHT: 62 IN

## 2023-05-30 DIAGNOSIS — R59.9 ENLARGED LYMPH NODES, UNSPECIFIED: ICD-10-CM

## 2023-05-30 DIAGNOSIS — J30.9 ALLERGIC RHINITIS, UNSPECIFIED: ICD-10-CM

## 2023-05-30 DIAGNOSIS — J35.8 OTHER CHRONIC DISEASES OF TONSILS AND ADENOIDS: ICD-10-CM

## 2023-05-30 DIAGNOSIS — K21.9 GASTRO-ESOPHAGEAL REFLUX DISEASE W/OUT ESOPHAGITIS: ICD-10-CM

## 2023-05-30 PROCEDURE — 99214 OFFICE O/P EST MOD 30 MIN: CPT | Mod: 25

## 2023-05-30 PROCEDURE — 31231 NASAL ENDOSCOPY DX: CPT

## 2023-05-30 RX ORDER — LIRAGLUTIDE 6 MG/ML
18 INJECTION, SOLUTION SUBCUTANEOUS
Qty: 15 | Refills: 0 | Status: COMPLETED | COMMUNITY
Start: 2022-05-15 | End: 2023-05-30

## 2023-05-30 RX ORDER — FENOFIBRATE 145 MG/1
145 TABLET, COATED ORAL
Qty: 90 | Refills: 0 | Status: COMPLETED | COMMUNITY
Start: 2022-04-30 | End: 2023-05-30

## 2023-05-30 RX ORDER — CETIRIZINE HYDROCHLORIDE 10 MG/1
10 TABLET, COATED ORAL DAILY
Qty: 30 | Refills: 5 | Status: ACTIVE | COMMUNITY
Start: 2022-11-15 | End: 1900-01-01

## 2023-05-30 RX ORDER — MOMETASONE 50 UG/1
50 SPRAY, METERED NASAL
Qty: 2 | Refills: 5 | Status: ACTIVE | COMMUNITY
Start: 2023-05-30 | End: 1900-01-01

## 2023-05-30 RX ORDER — FLUTICASONE PROPIONATE 50 UG/1
50 SPRAY, METERED NASAL TWICE DAILY
Qty: 2 | Refills: 5 | Status: COMPLETED | COMMUNITY
Start: 2022-08-16 | End: 2023-05-30

## 2023-05-30 RX ORDER — AMLODIPINE BESYLATE 10 MG/1
10 TABLET ORAL
Qty: 90 | Refills: 0 | Status: COMPLETED | COMMUNITY
Start: 2022-05-11 | End: 2023-05-30

## 2023-05-30 NOTE — HISTORY OF PRESENT ILLNESS
[de-identified] : 38 year old female presents for follow up of tonsils\par Hx of asymmetrical tonsils, L>R, LPRD and lymphadenopathy\par s/p excisional biopsy of left neck node 6/2021 \par s/p bariatric surgery 3/13/23 at [facility] \par Doing well since last visit, no new complaints or symptoms\par Denies dysphagia, odynophagia, aspirations, dyspnea, dysphonia, otalgia, recent fevers or ENT infections

## 2023-05-30 NOTE — PROCEDURE
[Anterior rhinoscopy insufficient to account for symptoms] : anterior rhinoscopy insufficient to account for symptoms [Globus] : globus [None] : none [Flexible Endoscope] : examined with the flexible endoscope [FreeTextEntry6] : No masses or lesions in the NC or NPx. Patient with improved but persistent clear secretions from [facility] bilaterally without any evidence of purulence of polypoid changes.  [Serial Number: ___] : Serial Number: [unfilled] [de-identified] : No lesions in the NPx, OPx, HPx or larynx. VC are mobile, airway patent. Patient with significant changes of LPRD on exam with interarytenoid pachyderma and edema.\par

## 2023-05-30 NOTE — PHYSICAL EXAM
[Nasal Endoscopy Performed] : nasal endoscopy was performed, see procedure section for findings [Laryngoscopy Performed] : laryngoscopy was performed, see procedure section for findings [Normal] : no rashes [de-identified] : Small, tender LAD along the posterior neck on the left and suboccipital on the right.  Incision is healing well.   [FreeTextEntry1] : Asymmetric L. tonsil - 3+ - stable.  Not particularly firm, no TTP.  No other concerning lesions in the OC/OPx on inspection or palpation.\par

## 2023-11-28 ENCOUNTER — APPOINTMENT (OUTPATIENT)
Dept: OTOLARYNGOLOGY | Facility: CLINIC | Age: 38
End: 2023-11-28

## 2024-03-09 ENCOUNTER — NON-APPOINTMENT (OUTPATIENT)
Age: 39
End: 2024-03-09

## 2025-07-24 NOTE — ED ADULT NURSE NOTE - ALCOHOL PRE SCREEN (AUDIT - C)
[employer] Alert Outreach Telephone Call Attempt     Patient is being outreached by the Care Transitions Program for a clinical alert from the [employer] monitoring program.     Outreach Attempt Date: 7/24/2025    First attempt via phone.  Second attempt via text message.    Case closed - unable to reach patient.  Will re-open case if patient calls back.   Statement Selected